# Patient Record
Sex: FEMALE | NOT HISPANIC OR LATINO | URBAN - METROPOLITAN AREA
[De-identification: names, ages, dates, MRNs, and addresses within clinical notes are randomized per-mention and may not be internally consistent; named-entity substitution may affect disease eponyms.]

---

## 2018-12-11 ENCOUNTER — INPATIENT (INPATIENT)
Facility: HOSPITAL | Age: 78
LOS: 10 days | Discharge: HOME HEALTH | End: 2018-12-22
Attending: INTERNAL MEDICINE | Admitting: INTERNAL MEDICINE
Payer: MEDICARE

## 2018-12-11 VITALS
DIASTOLIC BLOOD PRESSURE: 60 MMHG | RESPIRATION RATE: 18 BRPM | SYSTOLIC BLOOD PRESSURE: 129 MMHG | TEMPERATURE: 98 F | OXYGEN SATURATION: 99 % | HEART RATE: 89 BPM

## 2018-12-11 DIAGNOSIS — I25.84 CORONARY ATHEROSCLEROSIS DUE TO CALCIFIED CORONARY LESION: ICD-10-CM

## 2018-12-11 DIAGNOSIS — I25.5 ISCHEMIC CARDIOMYOPATHY: ICD-10-CM

## 2018-12-11 DIAGNOSIS — I11.0 HYPERTENSIVE HEART DISEASE WITH HEART FAILURE: ICD-10-CM

## 2018-12-11 DIAGNOSIS — I25.110 ATHEROSCLEROTIC HEART DISEASE OF NATIVE CORONARY ARTERY WITH UNSTABLE ANGINA PECTORIS: ICD-10-CM

## 2018-12-11 DIAGNOSIS — I10 ESSENTIAL (PRIMARY) HYPERTENSION: ICD-10-CM

## 2018-12-11 DIAGNOSIS — N39.0 URINARY TRACT INFECTION, SITE NOT SPECIFIED: ICD-10-CM

## 2018-12-11 DIAGNOSIS — I25.82 CHRONIC TOTAL OCCLUSION OF CORONARY ARTERY: ICD-10-CM

## 2018-12-11 DIAGNOSIS — I50.21 ACUTE SYSTOLIC (CONGESTIVE) HEART FAILURE: ICD-10-CM

## 2018-12-11 DIAGNOSIS — Z87.891 PERSONAL HISTORY OF NICOTINE DEPENDENCE: ICD-10-CM

## 2018-12-11 DIAGNOSIS — K52.9 NONINFECTIVE GASTROENTERITIS AND COLITIS, UNSPECIFIED: ICD-10-CM

## 2018-12-11 DIAGNOSIS — R07.9 CHEST PAIN, UNSPECIFIED: ICD-10-CM

## 2018-12-11 LAB
ALBUMIN SERPL ELPH-MCNC: 4.2 G/DL — SIGNIFICANT CHANGE UP (ref 3.5–5.2)
ALP SERPL-CCNC: 94 U/L — SIGNIFICANT CHANGE UP (ref 30–115)
ALT FLD-CCNC: 14 U/L — SIGNIFICANT CHANGE UP (ref 0–41)
ANION GAP SERPL CALC-SCNC: 20 MMOL/L — HIGH (ref 7–14)
AST SERPL-CCNC: 19 U/L — SIGNIFICANT CHANGE UP (ref 0–41)
BASE EXCESS BLDV CALC-SCNC: -0.6 MMOL/L — SIGNIFICANT CHANGE UP (ref -2–2)
BILIRUB SERPL-MCNC: 0.4 MG/DL — SIGNIFICANT CHANGE UP (ref 0.2–1.2)
BUN SERPL-MCNC: 15 MG/DL — SIGNIFICANT CHANGE UP (ref 10–20)
CA-I SERPL-SCNC: 1.23 MMOL/L — SIGNIFICANT CHANGE UP (ref 1.12–1.3)
CALCIUM SERPL-MCNC: 9.4 MG/DL — SIGNIFICANT CHANGE UP (ref 8.5–10.1)
CHLORIDE SERPL-SCNC: 104 MMOL/L — SIGNIFICANT CHANGE UP (ref 98–110)
CO2 SERPL-SCNC: 21 MMOL/L — SIGNIFICANT CHANGE UP (ref 17–32)
CREAT SERPL-MCNC: 1.2 MG/DL — SIGNIFICANT CHANGE UP (ref 0.7–1.5)
GAS PNL BLDV: 142 MMOL/L — SIGNIFICANT CHANGE UP (ref 136–145)
GAS PNL BLDV: SIGNIFICANT CHANGE UP
GLUCOSE BLDC GLUCOMTR-MCNC: 123 MG/DL — HIGH (ref 70–99)
GLUCOSE SERPL-MCNC: 104 MG/DL — HIGH (ref 70–99)
HCO3 BLDV-SCNC: 23 MMOL/L — SIGNIFICANT CHANGE UP (ref 22–29)
HCT VFR BLD CALC: 34.7 % — LOW (ref 37–47)
HCT VFR BLDA CALC: 37.4 % — SIGNIFICANT CHANGE UP (ref 34–44)
HGB BLD CALC-MCNC: 12.2 G/DL — LOW (ref 14–18)
HGB BLD-MCNC: 11.5 G/DL — LOW (ref 12–16)
LACTATE BLDV-MCNC: 0.9 MMOL/L — SIGNIFICANT CHANGE UP (ref 0.5–1.6)
MCHC RBC-ENTMCNC: 28.8 PG — SIGNIFICANT CHANGE UP (ref 27–31)
MCHC RBC-ENTMCNC: 33.1 G/DL — SIGNIFICANT CHANGE UP (ref 32–37)
MCV RBC AUTO: 86.8 FL — SIGNIFICANT CHANGE UP (ref 81–99)
NRBC # BLD: 0 /100 WBCS — SIGNIFICANT CHANGE UP (ref 0–0)
PCO2 BLDV: 33 MMHG — LOW (ref 41–51)
PH BLDV: 7.45 — HIGH (ref 7.26–7.43)
PLATELET # BLD AUTO: 261 K/UL — SIGNIFICANT CHANGE UP (ref 130–400)
PO2 BLDV: 50 MMHG — HIGH (ref 20–40)
POTASSIUM BLDV-SCNC: 3.9 MMOL/L — SIGNIFICANT CHANGE UP (ref 3.3–5.6)
POTASSIUM SERPL-MCNC: 4.1 MMOL/L — SIGNIFICANT CHANGE UP (ref 3.5–5)
POTASSIUM SERPL-SCNC: 4.1 MMOL/L — SIGNIFICANT CHANGE UP (ref 3.5–5)
PROT SERPL-MCNC: 7 G/DL — SIGNIFICANT CHANGE UP (ref 6–8)
RBC # BLD: 4 M/UL — LOW (ref 4.2–5.4)
RBC # FLD: 13.4 % — SIGNIFICANT CHANGE UP (ref 11.5–14.5)
SAO2 % BLDV: 89 % — SIGNIFICANT CHANGE UP
SODIUM SERPL-SCNC: 145 MMOL/L — SIGNIFICANT CHANGE UP (ref 135–146)
TROPONIN T SERPL-MCNC: <0.01 NG/ML — SIGNIFICANT CHANGE UP
WBC # BLD: 8.98 K/UL — SIGNIFICANT CHANGE UP (ref 4.8–10.8)
WBC # FLD AUTO: 8.98 K/UL — SIGNIFICANT CHANGE UP (ref 4.8–10.8)

## 2018-12-11 RX ORDER — ASPIRIN/CALCIUM CARB/MAGNESIUM 324 MG
81 TABLET ORAL DAILY
Qty: 0 | Refills: 0 | Status: DISCONTINUED | OUTPATIENT
Start: 2018-12-11 | End: 2018-12-22

## 2018-12-11 RX ORDER — ASPIRIN/CALCIUM CARB/MAGNESIUM 324 MG
162 TABLET ORAL ONCE
Qty: 0 | Refills: 0 | Status: COMPLETED | OUTPATIENT
Start: 2018-12-11 | End: 2018-12-11

## 2018-12-11 RX ORDER — HEPARIN SODIUM 5000 [USP'U]/ML
4100 INJECTION INTRAVENOUS; SUBCUTANEOUS EVERY 6 HOURS
Qty: 0 | Refills: 0 | Status: DISCONTINUED | OUTPATIENT
Start: 2018-12-11 | End: 2018-12-12

## 2018-12-11 RX ORDER — ATORVASTATIN CALCIUM 80 MG/1
80 TABLET, FILM COATED ORAL AT BEDTIME
Qty: 0 | Refills: 0 | Status: DISCONTINUED | OUTPATIENT
Start: 2018-12-11 | End: 2018-12-22

## 2018-12-11 RX ORDER — METOPROLOL TARTRATE 50 MG
25 TABLET ORAL EVERY 12 HOURS
Qty: 0 | Refills: 0 | Status: DISCONTINUED | OUTPATIENT
Start: 2018-12-11 | End: 2018-12-18

## 2018-12-11 RX ORDER — HEPARIN SODIUM 5000 [USP'U]/ML
INJECTION INTRAVENOUS; SUBCUTANEOUS
Qty: 25000 | Refills: 0 | Status: DISCONTINUED | OUTPATIENT
Start: 2018-12-11 | End: 2018-12-12

## 2018-12-11 RX ORDER — SODIUM CHLORIDE 9 MG/ML
1000 INJECTION INTRAMUSCULAR; INTRAVENOUS; SUBCUTANEOUS
Qty: 0 | Refills: 0 | Status: DISCONTINUED | OUTPATIENT
Start: 2018-12-11 | End: 2018-12-12

## 2018-12-11 RX ORDER — CLOPIDOGREL BISULFATE 75 MG/1
300 TABLET, FILM COATED ORAL ONCE
Qty: 0 | Refills: 0 | Status: COMPLETED | OUTPATIENT
Start: 2018-12-11 | End: 2018-12-11

## 2018-12-11 RX ORDER — HEPARIN SODIUM 5000 [USP'U]/ML
4100 INJECTION INTRAVENOUS; SUBCUTANEOUS ONCE
Qty: 0 | Refills: 0 | Status: COMPLETED | OUTPATIENT
Start: 2018-12-11 | End: 2018-12-11

## 2018-12-11 RX ORDER — CLOPIDOGREL BISULFATE 75 MG/1
75 TABLET, FILM COATED ORAL DAILY
Qty: 0 | Refills: 0 | Status: DISCONTINUED | OUTPATIENT
Start: 2018-12-11 | End: 2018-12-13

## 2018-12-11 RX ORDER — PANTOPRAZOLE SODIUM 20 MG/1
40 TABLET, DELAYED RELEASE ORAL
Qty: 0 | Refills: 0 | Status: DISCONTINUED | OUTPATIENT
Start: 2018-12-11 | End: 2018-12-22

## 2018-12-11 RX ADMIN — SODIUM CHLORIDE 75 MILLILITER(S): 9 INJECTION INTRAMUSCULAR; INTRAVENOUS; SUBCUTANEOUS at 23:21

## 2018-12-11 RX ADMIN — HEPARIN SODIUM 4100 UNIT(S): 5000 INJECTION INTRAVENOUS; SUBCUTANEOUS at 19:34

## 2018-12-11 RX ADMIN — CLOPIDOGREL BISULFATE 300 MILLIGRAM(S): 75 TABLET, FILM COATED ORAL at 19:33

## 2018-12-11 RX ADMIN — HEPARIN SODIUM 800 UNIT(S)/HR: 5000 INJECTION INTRAVENOUS; SUBCUTANEOUS at 19:33

## 2018-12-11 RX ADMIN — ATORVASTATIN CALCIUM 80 MILLIGRAM(S): 80 TABLET, FILM COATED ORAL at 23:17

## 2018-12-11 RX ADMIN — Medication 162 MILLIGRAM(S): at 18:57

## 2018-12-11 NOTE — CONSULT NOTE ADULT - SUBJECTIVE AND OBJECTIVE BOX
Patient is a 78y old  Female who presents with a chief complaint of chest pain.   HPI:  78 yr old female with PMH of HTN p/w chest pain. History provided by both pt and her son. Pt c/o exertional dyspnea and substernal chest pressure relieved with rest, for last 2 and half weeks. Prior to that pt never had any symptoms. Patients symptoms were getting worse over the past few days and pt was getting chest pains even with minimal exertion and sometimes even at rest. Pt went to her PMD's office where ECG and blood work (cardiac blood work; details unknown) were abnormal and pt was sent to ED. Pt denies any h/o heart disease/ fever/ cough/ palpitations/ h/o bleeding. Pt is currently asymptomatic at rest.     ROS:  All other systems reviewed and are negative    PAST MEDICAL & SURGICAL HISTORY  Hypertension  No significant past surgical history      FAMILY HISTORY:  FAMILY HISTORY:  No pertinent family history in first degree relatives      SOCIAL HISTORY:  [-]smoker  [-]Alcohol  [-]Drug    ALLERGIES:  No Known Allergies      MEDICATIONS:  MEDICATIONS  (STANDING):  clopidogrel Tablet 300 milliGRAM(s) Oral once  heparin  Infusion.  Unit(s)/Hr (8 mL/Hr) IV Continuous <Continuous>  heparin  Injectable 4100 Unit(s) IV Push once    MEDICATIONS  (PRN):  heparin  Injectable 4100 Unit(s) IV Push every 6 hours PRN For aPTT less than 40      HOME MEDICATIONS:  Home Medications:      VITALS:   T(F): 97.5 (12-11 @ 16:05), Max: 97.5 (12-11 @ 16:05)  HR: 89 (12-11 @ 16:05) (89 - 89)  BP: 129/60 (12-11 @ 16:05) (129/60 - 129/60)  BP(mean): --  RR: 18 (12-11 @ 16:05) (18 - 18)  SpO2: 99% (12-11 @ 16:05) (99% - 99%)    I&O's Summary      PHYSICAL EXAM:  GEN: Alert and oriented X 3, No acute distress  HEENT: no pallor  NECK: Supple, no JVD  LUNGS: Clear to auscultation bilaterally  CARDIOVASCULAR: S1/S2 regular, no murmurs or rubs  ABD: Soft, BS+  EXT: No Lower extremity edema/cyanosis  NEURO: Non focal  SKIN: Intact    LABS:                        11.5   8.98  )-----------( 261      ( 11 Dec 2018 17:13 )             34.7     12-11    145  |  104  |  15  ----------------------------<  104<H>  4.1   |  21  |  1.2    Ca    9.4      11 Dec 2018 17:13    TPro  7.0  /  Alb  4.2  /  TBili  0.4  /  DBili  x   /  AST  19  /  ALT  14  /  AlkPhos  94  12-11      Troponin T, Serum: <0.01 ng/mL (12-11-18 @ 17:13)    CARDIAC MARKERS ( 11 Dec 2018 17:13 )  x     / <0.01 ng/mL / x     / x     / x            Troponin trend:    Serum Pro-Brain Natriuretic Peptide: 8394 pg/mL (12-11-18 @ 17:13)      Hemoglobin A1C   Thyroid      RADIOLOGY:      ECG:  SR, T wave inversion v2-v5

## 2018-12-11 NOTE — ED PROVIDER NOTE - ST/T WAVE
T wave inversions anteroseptal leads, no prior ekg found T wave bipahsic inversions anteroseptal leads, no prior ekg found

## 2018-12-11 NOTE — ED ADULT NURSE REASSESSMENT NOTE - NS ED NURSE REASSESS COMMENT FT1
patient assessed, no complaints of chest pain at this time, vital signs stable, cardiac monitoring maintained as per order, ASA 81mg given, plan of care reviewed with patient and son at bedside, will continue to monitor.

## 2018-12-11 NOTE — ED PROVIDER NOTE - MEDICAL DECISION MAKING DETAILS
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 Plan d/w patient and family.

## 2018-12-11 NOTE — H&P ADULT - NSHPPHYSICALEXAM_GEN_ALL_CORE
PHYSICAL EXAM:  GEN: No acute distress  HEENT: EOMI. No sclera icterus.   LUNGS: Clear to auscultation bilaterally. No wheeze/rales/crackles.   HEART: S1/S2 present. RRR. No murmur/gallops/rubs.   ABD: Soft, non-tender, non-distended. Bowel sounds present  EXT: No pitting edema.   NEURO: AAOX3. Non focal.

## 2018-12-11 NOTE — CONSULT NOTE ADULT - ASSESSMENT
78 yr old female with PMH of HTN p/w chest pain.      Unstable Angina      Plan:  -Wellens' pattern on ECG  -Possible Proximal LAD stenosis   -Pt is currently asymptomatic   - mg, Plavix 300 mg x1   -Heparin infusion ACS protocol, monitor PTT  -Lipitor 80mg, Lopressor 25 mg BID  -check ECHO, lipids, HBA1C  -serial CE  -if pain recurs, check stat ECG and notify cardiology team  -NPO after mid night for possible cardiac cath tomorrow. 78 yr old female with PMH of HTN p/w chest pain.      Unstable Angina      Plan:  -Wellens' pattern on ECG  -Possible Proximal LAD stenosis   -Pt is currently asymptomatic   - mg, Plavix 300 mg x1   -Heparin infusion ACS protocol, monitor PTT  -Lipitor 80mg, Lopressor 25 mg BID  -check ECHO, lipids, HBA1C  -serial CE, repeat ECG in AM  -if pain recurs, check stat ECG and notify cardiology team  -NPO after mid night for possible cardiac cath tomorrow.

## 2018-12-11 NOTE — ED PROVIDER NOTE - PROGRESS NOTE DETAILS
case d/w card fellow - missy arenas seen by crd fellow. still pending trop.  case endorsed to dr berg, f/u labs and onesimo Spoke to [place] cardiac fellow. Discussed labs -- Negative troponin, BNP 8300. Admit to telemetry, NPO after midnight. Dr. Mcclain advised to admit patient to CCU, started on aspirin, plavix, and heparin full ACS protocol

## 2018-12-11 NOTE — ED ADULT NURSE NOTE - CHPI ED NUR SYMPTOMS NEG
no tingling/no weakness/no chills/no decreased eating/drinking/no dizziness/no fever/no nausea/no vomiting

## 2018-12-11 NOTE — H&P ADULT - NSHPLABSRESULTS_GEN_ALL_CORE
11.5   8.98  )-----------( 261      ( 11 Dec 2018 17:13 )             34.7             12-11    145  |  104  |  15  ----------------------------<  104<H>  4.1   |  21  |  1.2    Ca    9.4      11 Dec 2018 17:13    TPro  7.0  /  Alb  4.2  /  TBili  0.4  /  DBili  x   /  AST  19  /  ALT  14  /  AlkPhos  94  12-11    LIVER FUNCTIONS - ( 11 Dec 2018 17:13 )  Alb: 4.2 g/dL / Pro: 7.0 g/dL / ALK PHOS: 94 U/L / ALT: 14 U/L / AST: 19 U/L / GGT: x                   CARDIAC MARKERS ( 11 Dec 2018 17:13 )  x     / <0.01 ng/mL / x     / x     / x                Serum Pro-Brain Natriuretic Peptide: 8394 pg/mL (12-11-18 @ 17:13)

## 2018-12-11 NOTE — H&P ADULT - ASSESSMENT
79 y/o female h/o HTN p/w CP. Started 2 weeks ago, started exertional a/w SOB, diaphoresis. CP and SOB worsened until Saturday. Went to [facility] -- took EKG and labs. When labwork returned, Ernst spoke to pt's son and said to come to ED for concerning labwork. Pt has never had CP like this in past. No fever, chills, recent travel, leg swelling      Typical Chest pain (r/o ACS)  - 1st CE: negative.   - BNP 8394  - EKG: T wave inversion in lateral leads.   - s/p ASA 162mg, Plavix 300mg x 1 in ED.  - start on heparin drip ACS protocol (monitor PTT with target of 55-75)  - c/w ASA, Plavix and statin  - c/w lopressor 25mg BID   - f/u 2nd CE  - f/u Lipid panel and HbA1c  - check Echo  - f/u Cardiology   - Keep NPO after midnight.   - will monitor in CCU.    HTN  - c/w home meds      DVT ppx: on heparin drip  GI ppx: PPI   Activity: Bed rest   Diet: DASH diet and NPO after midnight. 77 y/o female h/o HTN p/w CP. Started 2 weeks ago, started exertional a/w SOB, diaphoresis. CP and SOB worsened until Saturday. Went to [facility] -- took EKG and labs. When labwork returned, Ernst spoke to pt's son and said to come to ED for concerning labwork. Pt has never had CP like this in past. No fever, chills, recent travel, leg swelling      Typical Chest pain 2/2 Unstable angina   - Patient is currently asymptomatic and hemodynamically stable.   - 1st CE: negative.   - BNP 8394  - EKG: Wellens' pattern on ECG -->Possible Proximal LAD stenosis   - s/p ASA 162mg, Plavix 300mg x 1 in ED.  - start on heparin drip ACS protocol (monitor PTT with target of 55-75)  - c/w ASA, Plavix and statin  - c/w lopressor 25mg BID   - f/u 2nd CE  - f/u Lipid panel and HbA1c  - check Echo  - If Chest pain recurs --> get STAT EKG, CE and notify Cardio team.    - Keep NPO after midnight for possible LHC in AM.    - Cardio recs appreciated.   - will monitor in CCU.    HTN  - c/w home meds      DVT ppx: on heparin drip  GI ppx: PPI   Activity: Bed rest   Diet: DASH diet and NPO after midnight. 77 y/o female h/o HTN p/w CP. Started 2 weeks ago, started exertional a/w SOB, diaphoresis. CP and SOB worsened until Saturday. Went to [facility] -- took EKG and labs. When labwork returned, Ernst spoke to pt's son and said to come to ED for concerning labwork. Pt has never had CP like this in past. No fever, chills, recent travel, leg swelling      Typical Chest pain 2/2 Unstable angina   - Patient is currently asymptomatic and hemodynamically stable.   - 1st CE: negative.   - BNP 8394  - EKG: Wellens' pattern on ECG -->Possible Proximal LAD stenosis   - s/p ASA 162mg, Plavix 300mg x 1 in ED.  - start on heparin drip ACS protocol (monitor PTT with target of 55-75)  - c/w ASA, Plavix and statin  - c/w lopressor 25mg BID   - f/u 2nd CE  - f/u Lipid panel and HbA1c  - check Echo  - If Chest pain recurs --> get STAT EKG, CE and notify Cardio team.    - Keep NPO after midnight for possible LHC in AM.    - Cardio recs appreciated.   - will monitor in CCU.    FELIZ or CKD?  - will start on IVF NS @ 75cc/hr  - monitor BMP  - f/u Urine Electrolytes, Urine Cr, Urine Pro/Cr     HTN  - c/w home meds      DVT ppx: on heparin drip  GI ppx: PPI   Activity: Bed rest   Diet: DASH diet and NPO after midnight. 79 y/o female h/o HTN p/w CP. Started 2 weeks ago, started exertional a/w SOB, diaphoresis. CP and SOB worsened until Saturday. Went to [facility] -- took EKG and labs. When labwork returned, Ernst spoke to pt's son and said to come to ED for concerning labwork. Pt has never had CP like this in past. No fever, chills, recent travel, leg swelling      Typical Chest pain 2/2 Unstable angina   - Patient is currently asymptomatic and hemodynamically stable.   - 1st CE: negative.   - BNP 8394  - EKG: Wellens' pattern on ECG -->Possible Proximal LAD stenosis   - s/p ASA 162mg, Plavix 300mg x 1 in ED.  - start on heparin drip ACS protocol (monitor PTT with target of 55-75)  - c/w ASA, Plavix and statin  - c/w lopressor 25mg BID   - f/u 2nd CE  - f/u Lipid panel and HbA1c  - check Echo  - If Chest pain recurs --> get STAT EKG, CE and notify Cardio team.    - Keep NPO after midnight for possible LHC in AM.    - Cardio recs appreciated.   - will monitor in CCU.    FELIZ or CKD?  - will start on IVF NS @ 75cc/hr  - monitor BMP  - f/u U/A, Urine Electrolytes, Urine Cr, Urine Pro/Cr     HTN  - c/w home meds      DVT ppx: on heparin drip  GI ppx: PPI   Activity: Bed rest   Diet: DASH diet and NPO after midnight. 77 yo F with PMH of HTN present with substernal chest pressure relieved with rest, for the past 2 and half weeks a/w exertional dyspnea, found to have T wave bipahsic inversions anteroseptal leads. Patient was evaluated by cardio team in ED and recommends to start on heparin drip and CCU monitoring.       Typical Chest pain 2/2 Unstable angina   - Patient is currently asymptomatic and hemodynamically stable.   - 1st CE: negative.   - BNP 8394  - EKG: T wave bipahsic inversions anteroseptal leads. Wellens' pattern on ECG -->Possible Proximal LAD stenosis.  - s/p ASA 162mg, Plavix 300mg x 1 in ED.  - start on heparin drip ACS protocol (monitor PTT with target of 55-75)  - c/w ASA, Plavix and statin  - c/w lopressor 25mg BID   - f/u 2nd CE  - f/u Lipid panel and HbA1c  - check Echo  - If Chest pain recurs --> get STAT EKG, CE and notify Cardio team.    - Keep NPO after midnight for possible LHC in AM.    - Cardio recs appreciated.   - will monitor in CCU.    FELIZ or CKD?  - will start on IVF NS @ 75cc/hr  - monitor BMP  - f/u U/A, Urine Electrolytes, Urine Cr, Urine Pro/Cr     HTN  - c/w home meds      DVT ppx: on heparin drip  GI ppx: PPI   Activity: Bed rest   Diet: DASH diet and NPO after midnight.

## 2018-12-11 NOTE — H&P ADULT - ATTENDING COMMENTS
78 yr old female with PMH of HTN p/w chest pain. History provided by both pt and her son. Pt c/o exertional dyspnea and substernal chest pressure relieved with rest, for last 2 and half weeks. Prior to that pt never had any symptoms. Patients symptoms were getting worse over the past few days and pt was getting chest pains even with minimal exertion and sometimes even at rest. Pt went to her PMD's office where ECG and blood work (cardiac blood work; details unknown) were abnormal and pt was sent to ED. Pt denies any h/o heart disease/ fever/ cough/ palpitations/ h/o bleeding. Pt is currently asymptomatic at rest.       79 y/o female h/o HTN p/w CP. Started 2 weeks ago, started exertional a/w SOB, diaphoresis. CP and SOB worsened until Saturday. Went to [facility] -- took EKG and labs. When labwork returned, Cherise spoke to pt's son and said to come to ED for concerning labwork. Pt has never had CP like this in past. No fever, chills, recent travel, leg swelling    REVIEW OF SYSTEMS: see cc/HPI   CONSTITUTIONAL: No weakness, fevers or chills  EYES/ENT: No visual changes;  No vertigo or throat pain   NECK: No pain or stiffness  RESPIRATORY: No cough, wheezing, hemoptysis; No shortness of breath  CARDIOVASCULAR: (+) chest pain or palpitations  GASTROINTESTINAL: No abdominal or epigastric pain. No nausea, vomiting, or hematemesis; No diarrhea or constipation. No melena or hematochezia.  GENITOURINARY: No dysuria, frequency or hematuria  NEUROLOGICAL: No numbness or weakness  SKIN: No itching, rashes    Physical Exam:  General: WN/WD NAD  Neurology: A&Ox3, nonfocal, follows commands  Eyes: PERRLA/ EOMI  ENT/Neck: Neck supple, trachea midline, No JVD  Respiratory: CTA B/L, No wheezing, rales, rhonchi  CV: Normal rate regular rhythm, S1S2, no murmurs, rubs or gallops  Abdominal: Soft, NT, ND +BS,   Extremities: No edema, + peripheral pulses  Skin: No Rashes, Hematoma, Ecchymosis  Incisions:   Tubes:    A/P 78 yr old female with PMH of HTN p/w chest pain. History provided by both pt and her son. Pt c/o exertional dyspnea and substernal chest pressure relieved with rest, for last 2 and half weeks. Prior to that pt never had any symptoms. Patients symptoms were getting worse over the past few days and pt was getting chest pains even with minimal exertion and sometimes even at rest. Pt went to her PMD's office where ECG and blood work (cardiac blood work; details unknown) were abnormal and pt was sent to ED. Pt denies any h/o heart disease/ fever/ cough/ palpitations/ h/o bleeding. Pt is currently asymptomatic at rest.       79 y/o female h/o HTN p/w CP. Started 2 weeks ago, started exertional a/w SOB, diaphoresis. CP and SOB worsened until Saturday. Went to PMD [facility] -- took EKG and labs. When labwork returned, Cherise spoke to pt's son and said to come to ED for concerning labwork. Pt has never had CP like this in past. No fever, chills, recent travel, leg swelling    REVIEW OF SYSTEMS: see cc/HPI   CONSTITUTIONAL: No weakness, fevers or chills  EYES/ENT: No visual changes;  No vertigo or throat pain   NECK: No pain or stiffness  RESPIRATORY: No cough, wheezing, hemoptysis; No shortness of breath  CARDIOVASCULAR: (+) chest pain or palpitations  GASTROINTESTINAL: No abdominal or epigastric pain. No nausea, vomiting, or hematemesis; No diarrhea or constipation. No melena or hematochezia.  GENITOURINARY: No dysuria, frequency or hematuria  NEUROLOGICAL: No numbness or weakness  SKIN: No itching, rashes    < from: 12 Lead ECG (12.11.18 @ 16:12) >  Ventricular Rate 68 BPM  Atrial Rate 68 BPM  P-R Interval 134 ms  QRS Duration 88 ms  Q-T Interval 440 ms  QTC Calculation(Bezet) 467 ms  P Axis 50 degrees  R Axis 35 degrees  T Axis 108 degrees  Diagnosis Line Normal sinus rhythm  Anteroseptal infarct , age undetermined  T wave abnormality, consider lateral ischemia  Abnormal ECG  Confirmed by Phong Aquino ([id number]) on 12/12/2018 6:13:07 AM  < end of copied text >        Physical Exam:  General: WN/WD NAD  Neurology: A&Ox3, nonfocal, follows commands  Eyes: PERRLA/ EOMI  ENT/Neck: Neck supple, trachea midline, No JVD  Respiratory: CTA B/L, No wheezing, rales, rhonchi  CV: Normal rate regular rhythm, S1S2, no murmurs, rubs or gallops  Abdominal: Soft, NT, ND +BS,   Extremities: No edema, + peripheral pulses  Skin: No Rashes, Hematoma, Ecchymosis  Incisions:   Tubes:    A/P  Unstable angina  -IV heparin drip and PTT monitoring   -NPO for possible cath  -ASA/Plavix/ BBlocker/ statin   -serial CE and EKG  -Cardiology eval     FELIZ  -IV hydration and repeat Scr  -Is and Os , urine lytes/sc    HTN   -as above

## 2018-12-11 NOTE — ED PROVIDER NOTE - OBJECTIVE STATEMENT
79 y/o female h/o HTN p/w CP. Started 2 weeks ago, started exertional a/w SOB, diaphoresis. CP and SOB worsened until Saturday. Went to [facility] -- took EKG and labs. When labwork returned, Ernst spoke to pt's son and said to come to ED for concerning labwork. Pt has never had CP like this in past. No fever, chills, recent travel, leg swelling.

## 2018-12-11 NOTE — H&P ADULT - HISTORY OF PRESENT ILLNESS
79 y/o female h/o HTN p/w CP. Started 2 weeks ago, started exertional a/w SOB, diaphoresis. CP and SOB worsened until Saturday. Went to [facility] -- took EKG and labs. When labwork returned, Ernst spoke to pt's son and said to come to ED for concerning labwork. Pt has never had CP like this in past. No fever, chills, recent travel, leg swelling 78 yr old female with PMH of HTN p/w chest pain. History provided by both pt and her son. Pt c/o exertional dyspnea and substernal chest pressure relieved with rest, for last 2 and half weeks. Prior to that pt never had any symptoms. Patients symptoms were getting worse over the past few days and pt was getting chest pains even with minimal exertion and sometimes even at rest. Pt went to her PMD's office where ECG and blood work (cardiac blood work; details unknown) were abnormal and pt was sent to ED. Pt denies any h/o heart disease/ fever/ cough/ palpitations/ h/o bleeding. Pt is currently asymptomatic at rest.       77 y/o female h/o HTN p/w CP. Started 2 weeks ago, started exertional a/w SOB, diaphoresis. CP and SOB worsened until Saturday. Went to [facility] -- took EKG and labs. When labwork returned, Ernst spoke to pt's son and said to come to ED for concerning labwork. Pt has never had CP like this in past. No fever, chills, recent travel, leg swelling 77 yo F with PMH of HTN present with chest pain. Patient complaints of substernal chest pressure relieved with rest, for the past 2 and half weeks a/w extertional dyspnea. Patient never had similar symptoms in the past. As per patient, her were getting worse over the past few days to the point that she started getting chest pains even with minimal exertion and sometimes even at rest. So, patient went to her PMD where ECG and blood work were done (cardiac blood work; details unknown)  and was told it was abnormal. Patient was sent by her to the ED. Patient denied any other symptoms.   In ED, patient was found to have T wave bipahsic inversions anteroseptal leads. Currently, Patient is asymptomatic at rest.

## 2018-12-11 NOTE — ED ADULT NURSE NOTE - OBJECTIVE STATEMENT
Patient is a 79 y/o female presenting complaining of chest pain, shortness of breath and diaphoresis. Patient denies any fever, chills, nausea, vomiting.

## 2018-12-11 NOTE — H&P ADULT - NSHPREVIEWOFSYSTEMS_GEN_ALL_CORE
REVIEW OF SYSTEMS:    CONSTITUTIONAL: No weakness, fevers or chills  EYES/ENT: No visual changes;  No vertigo or throat pain   NECK: No pain or stiffness  RESPIRATORY: No cough, wheezing, hemoptysis; No shortness of breath  CARDIOVASCULAR: No chest pain or palpitations  GASTROINTESTINAL: No abdominal or epigastric pain. No nausea, vomiting, or hematemesis; No diarrhea or constipation. No melena or hematochezia.  GENITOURINARY: No dysuria, frequency or hematuria  NEUROLOGICAL: No numbness or weakness  SKIN: No itching, rashes REVIEW OF SYSTEMS:    CONSTITUTIONAL: No weakness, fevers or chills  EYES/ENT: No visual changes;  No vertigo or throat pain   NECK: No pain or stiffness.  RESPIRATORY: No cough, wheezing, hemoptysis; No shortness of breath  CARDIOVASCULAR: No current chest pain or palpitations.   GASTROINTESTINAL: No abdominal or epigastric pain. No nausea, vomiting, or hematemesis; No diarrhea or constipation. No melena or hematochezia.  GENITOURINARY: No dysuria, frequency or hematuria  NEUROLOGICAL: No numbness or weakness  SKIN: No itching, rashes

## 2018-12-11 NOTE — ED PROVIDER NOTE - PHYSICAL EXAMINATION
Constitutional: Well developed, well nourished. NAD. Good general hygiene  Head: Atraumatic.  Eyes: PERRLA. EOMI without discomfort.   ENT: No nasal discharge. Mucous membranes moist.  Neck: Supple. Painless ROM.  Cardiovascular: Regular rhythm. Regular rate. Normal S1 and S2. No murmurs. 2+ pulses in all extremities.   Pulmonary: Normal respiratory rate and effort. Lungs clear to auscultation bilaterally. No wheezing, rales, or rhonchi. Bilateral, equal lung expansion.   Abdominal: Soft. Nondistended. Nontender. No rebound or guarding.   Extremities. Pelvis stable. No lower extremity edema. Symmetric calves.  Skin: No rashes.   Neuro: AAOx3. No focal neurological deficits.  Psych: Normal mood. Normal affect.

## 2018-12-11 NOTE — ED PROVIDER NOTE - NS ED ROS FT
Constitutional: No fever, chills.  Eyes:  No visual changes, eye pain or discharge.  ENMT:  No hearing changes, pain, no sore throat or runny nose, no difficulty swallowing  Cardiac:  CP, SOB. CP w/ exertion.  Respiratory:  No cough or respiratory distress. No hemoptysis. No history of asthma or RAD.  GI:  No nausea, vomiting, diarrhea or abdominal pain.  :  No dysuria, frequency or burning.  MS:  No myalgia, muscle weakness, joint pain or back pain.  Neuro:  No headache or weakness.  No LOC.  Skin:  No skin rash.   Endocrine: No history of thyroid disease or diabetes.

## 2018-12-11 NOTE — ED PROVIDER NOTE - ATTENDING CONTRIBUTION TO CARE
77 yo F htn, now with intermittent exertional CP, nonradiating. + weakness.  vss, nontoxic, well appearing, pink conj, anicteric, MMM, neck supple, CTAB, RRR, equal radial pulses bilat, abd soft/nt/nd, no cva tend. no calves tend, no edema, no fnd. no rashes.  EKG with Wellens, a/p: labs, imaging, reassess

## 2018-12-12 LAB
ANION GAP SERPL CALC-SCNC: 14 MMOL/L — SIGNIFICANT CHANGE UP (ref 7–14)
APPEARANCE UR: ABNORMAL
APTT BLD: 48.8 SEC — HIGH (ref 27–39.2)
APTT BLD: 65.3 SEC — HIGH (ref 27–39.2)
BACTERIA # UR AUTO: ABNORMAL /HPF
BASOPHILS # BLD AUTO: 0.04 K/UL — SIGNIFICANT CHANGE UP (ref 0–0.2)
BASOPHILS NFR BLD AUTO: 0.6 % — SIGNIFICANT CHANGE UP (ref 0–1)
BILIRUB UR-MCNC: NEGATIVE — SIGNIFICANT CHANGE UP
BUN SERPL-MCNC: 14 MG/DL — SIGNIFICANT CHANGE UP (ref 10–20)
CALCIUM SERPL-MCNC: 9.1 MG/DL — SIGNIFICANT CHANGE UP (ref 8.5–10.1)
CHLORIDE SERPL-SCNC: 108 MMOL/L — SIGNIFICANT CHANGE UP (ref 98–110)
CHOLEST SERPL-MCNC: 156 MG/DL — SIGNIFICANT CHANGE UP (ref 100–200)
CK MB CFR SERPL CALC: 2.1 NG/ML — SIGNIFICANT CHANGE UP (ref 0.6–6.3)
CK MB CFR SERPL CALC: 2.1 NG/ML — SIGNIFICANT CHANGE UP (ref 0.6–6.3)
CK SERPL-CCNC: 109 U/L — SIGNIFICANT CHANGE UP (ref 0–225)
CK SERPL-CCNC: 110 U/L — SIGNIFICANT CHANGE UP (ref 0–225)
CO2 SERPL-SCNC: 24 MMOL/L — SIGNIFICANT CHANGE UP (ref 17–32)
COLOR SPEC: YELLOW — SIGNIFICANT CHANGE UP
CREAT ?TM UR-MCNC: 38 MG/DL — SIGNIFICANT CHANGE UP
CREAT SERPL-MCNC: 1 MG/DL — SIGNIFICANT CHANGE UP (ref 0.7–1.5)
DIFF PNL FLD: ABNORMAL
EOSINOPHIL # BLD AUTO: 0.15 K/UL — SIGNIFICANT CHANGE UP (ref 0–0.7)
EOSINOPHIL NFR BLD AUTO: 2.2 % — SIGNIFICANT CHANGE UP (ref 0–8)
ESTIMATED AVERAGE GLUCOSE: 105 MG/DL — SIGNIFICANT CHANGE UP (ref 68–114)
GLUCOSE SERPL-MCNC: 98 MG/DL — SIGNIFICANT CHANGE UP (ref 70–99)
GLUCOSE UR QL: NEGATIVE MG/DL — SIGNIFICANT CHANGE UP
HBA1C BLD-MCNC: 5.3 % — SIGNIFICANT CHANGE UP (ref 4–5.6)
HCT VFR BLD CALC: 34.3 % — LOW (ref 37–47)
HDLC SERPL-MCNC: 56 MG/DL — SIGNIFICANT CHANGE UP
HGB BLD-MCNC: 11.2 G/DL — LOW (ref 12–16)
IMM GRANULOCYTES NFR BLD AUTO: 0.3 % — SIGNIFICANT CHANGE UP (ref 0.1–0.3)
KETONES UR-MCNC: NEGATIVE — SIGNIFICANT CHANGE UP
LEUKOCYTE ESTERASE UR-ACNC: ABNORMAL
LIPID PNL WITH DIRECT LDL SERPL: 90 MG/DL — SIGNIFICANT CHANGE UP (ref 4–129)
LYMPHOCYTES # BLD AUTO: 2.07 K/UL — SIGNIFICANT CHANGE UP (ref 1.2–3.4)
LYMPHOCYTES # BLD AUTO: 30.2 % — SIGNIFICANT CHANGE UP (ref 20.5–51.1)
MAGNESIUM SERPL-MCNC: 2.1 MG/DL — SIGNIFICANT CHANGE UP (ref 1.8–2.4)
MCHC RBC-ENTMCNC: 28.4 PG — SIGNIFICANT CHANGE UP (ref 27–31)
MCHC RBC-ENTMCNC: 32.7 G/DL — SIGNIFICANT CHANGE UP (ref 32–37)
MCV RBC AUTO: 87.1 FL — SIGNIFICANT CHANGE UP (ref 81–99)
MONOCYTES # BLD AUTO: 0.53 K/UL — SIGNIFICANT CHANGE UP (ref 0.1–0.6)
MONOCYTES NFR BLD AUTO: 7.7 % — SIGNIFICANT CHANGE UP (ref 1.7–9.3)
NEUTROPHILS # BLD AUTO: 4.04 K/UL — SIGNIFICANT CHANGE UP (ref 1.4–6.5)
NEUTROPHILS NFR BLD AUTO: 59 % — SIGNIFICANT CHANGE UP (ref 42.2–75.2)
NITRITE UR-MCNC: POSITIVE
NRBC # BLD: 0 /100 WBCS — SIGNIFICANT CHANGE UP (ref 0–0)
PH UR: 7 — SIGNIFICANT CHANGE UP (ref 5–8)
PLATELET # BLD AUTO: 231 K/UL — SIGNIFICANT CHANGE UP (ref 130–400)
POTASSIUM SERPL-MCNC: 4.5 MMOL/L — SIGNIFICANT CHANGE UP (ref 3.5–5)
POTASSIUM SERPL-SCNC: 4.5 MMOL/L — SIGNIFICANT CHANGE UP (ref 3.5–5)
PROT ?TM UR-MCNC: 19 MG/DLG/24H — SIGNIFICANT CHANGE UP
PROT UR-MCNC: NEGATIVE MG/DL — SIGNIFICANT CHANGE UP
PROT/CREAT UR-RTO: 0.5 RATIO — HIGH (ref 0–0.2)
RBC # BLD: 3.94 M/UL — LOW (ref 4.2–5.4)
RBC # FLD: 13.4 % — SIGNIFICANT CHANGE UP (ref 11.5–14.5)
RBC CASTS # UR COMP ASSIST: ABNORMAL /HPF
SODIUM SERPL-SCNC: 146 MMOL/L — SIGNIFICANT CHANGE UP (ref 135–146)
SODIUM UR-SCNC: 116 MMOL/L — SIGNIFICANT CHANGE UP
SP GR SPEC: 1.01 — SIGNIFICANT CHANGE UP (ref 1.01–1.03)
TOTAL CHOLESTEROL/HDL RATIO MEASUREMENT: 2.8 RATIO — LOW (ref 4–5.5)
TRIGL SERPL-MCNC: 81 MG/DL — SIGNIFICANT CHANGE UP (ref 10–149)
TROPONIN T SERPL-MCNC: <0.01 NG/ML — SIGNIFICANT CHANGE UP
TROPONIN T SERPL-MCNC: <0.01 NG/ML — SIGNIFICANT CHANGE UP
UROBILINOGEN FLD QL: 0.2 MG/DL — SIGNIFICANT CHANGE UP (ref 0.2–0.2)
UUN UR-MCNC: 259 MG/DL — SIGNIFICANT CHANGE UP
WBC # BLD: 6.85 K/UL — SIGNIFICANT CHANGE UP (ref 4.8–10.8)
WBC # FLD AUTO: 6.85 K/UL — SIGNIFICANT CHANGE UP (ref 4.8–10.8)
WBC UR QL: ABNORMAL /HPF

## 2018-12-12 PROCEDURE — 93010 ELECTROCARDIOGRAM REPORT: CPT

## 2018-12-12 RX ORDER — HEPARIN SODIUM 5000 [USP'U]/ML
800 INJECTION INTRAVENOUS; SUBCUTANEOUS
Qty: 25000 | Refills: 0 | Status: DISCONTINUED | OUTPATIENT
Start: 2018-12-12 | End: 2018-12-13

## 2018-12-12 RX ORDER — HEPARIN SODIUM 5000 [USP'U]/ML
4100 INJECTION INTRAVENOUS; SUBCUTANEOUS EVERY 6 HOURS
Qty: 0 | Refills: 0 | Status: DISCONTINUED | OUTPATIENT
Start: 2018-12-12 | End: 2018-12-12

## 2018-12-12 RX ORDER — HEPARIN SODIUM 5000 [USP'U]/ML
800 INJECTION INTRAVENOUS; SUBCUTANEOUS
Qty: 25000 | Refills: 0 | Status: DISCONTINUED | OUTPATIENT
Start: 2018-12-12 | End: 2018-12-12

## 2018-12-12 RX ORDER — HEPARIN SODIUM 5000 [USP'U]/ML
INJECTION INTRAVENOUS; SUBCUTANEOUS
Qty: 25000 | Refills: 0 | Status: DISCONTINUED | OUTPATIENT
Start: 2018-12-12 | End: 2018-12-12

## 2018-12-12 RX ORDER — HEPARIN SODIUM 5000 [USP'U]/ML
4100 INJECTION INTRAVENOUS; SUBCUTANEOUS ONCE
Qty: 0 | Refills: 0 | Status: DISCONTINUED | OUTPATIENT
Start: 2018-12-12 | End: 2018-12-12

## 2018-12-12 RX ORDER — CEFTRIAXONE 500 MG/1
1 INJECTION, POWDER, FOR SOLUTION INTRAMUSCULAR; INTRAVENOUS EVERY 24 HOURS
Qty: 0 | Refills: 0 | Status: COMPLETED | OUTPATIENT
Start: 2018-12-12 | End: 2018-12-15

## 2018-12-12 RX ORDER — ONDANSETRON 8 MG/1
4 TABLET, FILM COATED ORAL ONCE
Qty: 0 | Refills: 0 | Status: DISCONTINUED | OUTPATIENT
Start: 2018-12-12 | End: 2018-12-17

## 2018-12-12 RX ORDER — CHLORHEXIDINE GLUCONATE 213 G/1000ML
1 SOLUTION TOPICAL
Qty: 0 | Refills: 0 | Status: DISCONTINUED | OUTPATIENT
Start: 2018-12-12 | End: 2018-12-22

## 2018-12-12 RX ORDER — HEPARIN SODIUM 5000 [USP'U]/ML
500 INJECTION INTRAVENOUS; SUBCUTANEOUS
Qty: 25000 | Refills: 0 | Status: DISCONTINUED | OUTPATIENT
Start: 2018-12-12 | End: 2018-12-12

## 2018-12-12 RX ADMIN — HEPARIN SODIUM 8 UNIT(S)/HR: 5000 INJECTION INTRAVENOUS; SUBCUTANEOUS at 09:41

## 2018-12-12 RX ADMIN — HEPARIN SODIUM 8 UNIT(S)/HR: 5000 INJECTION INTRAVENOUS; SUBCUTANEOUS at 21:44

## 2018-12-12 RX ADMIN — PANTOPRAZOLE SODIUM 40 MILLIGRAM(S): 20 TABLET, DELAYED RELEASE ORAL at 05:11

## 2018-12-12 RX ADMIN — CEFTRIAXONE 100 GRAM(S): 500 INJECTION, POWDER, FOR SOLUTION INTRAMUSCULAR; INTRAVENOUS at 15:15

## 2018-12-12 RX ADMIN — CLOPIDOGREL BISULFATE 75 MILLIGRAM(S): 75 TABLET, FILM COATED ORAL at 12:19

## 2018-12-12 RX ADMIN — Medication 25 MILLIGRAM(S): at 19:22

## 2018-12-12 RX ADMIN — HEPARIN SODIUM 800 UNIT(S)/HR: 5000 INJECTION INTRAVENOUS; SUBCUTANEOUS at 02:08

## 2018-12-12 RX ADMIN — ATORVASTATIN CALCIUM 80 MILLIGRAM(S): 80 TABLET, FILM COATED ORAL at 21:20

## 2018-12-12 RX ADMIN — Medication 25 MILLIGRAM(S): at 05:10

## 2018-12-12 RX ADMIN — Medication 81 MILLIGRAM(S): at 12:19

## 2018-12-12 NOTE — PROGRESS NOTE ADULT - SUBJECTIVE AND OBJECTIVE BOX
PREOPERATIVE DAY OF PROCEDURE EVALUATION:  I have personally seen and examined the patient.  I agree with the history and physical which I have reviewed and noted any changes below.  (Signed electronically by __________)  12-12-18 @ 18:34

## 2018-12-12 NOTE — PROGRESS NOTE ADULT - SUBJECTIVE AND OBJECTIVE BOX
POST OPERATIVE PROCEDURAL DOCUMENTATION  PRE-OP DIAGNOSIS: CAD    POST-OP DIAGNOSIS: CAD    PROCEDURE:   LEFT HEART CATHERIZATION    Physician: Albino DUNNE  Assistant:  Ludwig DUNNE    ANESTHESIA TYPE:  [x ] Sedation  [x ] Local/Regional  [  ]General Anesthesia    ESTIMATED BLOOD LOSS: < 10 ml         CONDITION  [x ] Good  [   ] Fair  [   ] Serious  [   ] Critical      SPECIMENS REMOVED (IF APPLICABLE):  n/a      IMPLANTS (IF APPLICABLE): none      FINDINGS:    LEFT HEART CATHERIZATION                        LVEDP: WNL  LVEF: 30-35% by echo    Left mainmild distal disease    LAD:  Proxiaml: 100% stenosis without late distal filling.                    Diag: supplied by collaterals from LCx    Left Circumflex:  Proximal : moderate disease  OM1:  two sequential 70% lesions    Right Cornary Artery: Proximal RCA: 100% stenosis, distal RCA supplied by collaterals from LCx    PERCUTANEOUS CORONARY INTERVENTIONS: none      COMPLICATIONS:  none      POST-OP DIAGNOSIS: CAD          PLAN OF CARE      [ ] D/C Home today   [ ]  D/C in AM  [x ] Return to In-patient bed  [ ] Admit for observation  [ ] Return for staged procedure:  [x ] CT Surgery consult called  [x ]  Continue DAPT, B-blocker & Statin therapy  [x ]  Medical Therapy  [x ] Aggressive risk factor modification. The patient should follow a low fat and low calorie diet.  Patient may need viability test in next 24 hours, will discuss with CTS team and patient may need CABG vs High risk PCI. POST OPERATIVE PROCEDURAL DOCUMENTATION  PRE-OP DIAGNOSIS: CAD    POST-OP DIAGNOSIS: CAD    PROCEDURE:   LEFT HEART CATHERIZATION    Physician: Albino DUNNE  Assistant:  Ludwig DUNNE    ANESTHESIA TYPE:  [x ] Sedation  [x ] Local/Regional  [  ]General Anesthesia    ESTIMATED BLOOD LOSS: < 10 ml         CONDITION  [x ] Good  [   ] Fair  [   ] Serious  [   ] Critical      SPECIMENS REMOVED (IF APPLICABLE):  n/a      IMPLANTS (IF APPLICABLE): none      FINDINGS:    LEFT HEART CATHERIZATION                        LVEDP: WNL  LVEF: 30-35% by echo    Left mainmild distal disease    LAD:  Proxiaml: 100% stenosis without late distal filling.                    Diag: supplied by collaterals from LCx    Left Circumflex:  Proximal : moderate disease  OM1:  two sequential 70% lesions    Right Cornary Artery: Proximal RCA: 100% stenosis, distal RCA supplied by collaterals from LCx    PERCUTANEOUS CORONARY INTERVENTIONS: none      COMPLICATIONS:  none      POST-OP DIAGNOSIS: CAD          PLAN OF CARE      [ ] D/C Home today   [ ]  D/C in AM  [x ] Return to In-patient bed  [ ] Admit for observation  [ ] Return for staged procedure:  [x ] CT Surgery consult called  [x ]  Continue DAPT, B-blocker & Statin therapy  [x ]  Medical Therapy  [x ] Aggressive risk factor modification. The patient should follow a low fat and low calorie diet.  Patient may need viability test in next 24 hours, will discuss with CTS team and patient may need CABG vs High risk PCI.      Severe 3vcad with total rca and LAD without any good collaterals to LAD nor any substantial antegrade flow.   The LCX is the only patent vessels with 70% tandem lesion.     If no viability and is not a candidate for surgery will do high risk PCI nayan next week on LCX.     In the interim will get EPS consult as well.

## 2018-12-13 LAB
ALBUMIN SERPL ELPH-MCNC: 3.8 G/DL — SIGNIFICANT CHANGE UP (ref 3.5–5.2)
ALP SERPL-CCNC: 85 U/L — SIGNIFICANT CHANGE UP (ref 30–115)
ALT FLD-CCNC: 11 U/L — SIGNIFICANT CHANGE UP (ref 0–41)
ANION GAP SERPL CALC-SCNC: 12 MMOL/L — SIGNIFICANT CHANGE UP (ref 7–14)
APTT BLD: 34.5 SEC — SIGNIFICANT CHANGE UP (ref 27–39.2)
AST SERPL-CCNC: 15 U/L — SIGNIFICANT CHANGE UP (ref 0–41)
BILIRUB SERPL-MCNC: 0.7 MG/DL — SIGNIFICANT CHANGE UP (ref 0.2–1.2)
BLD GP AB SCN SERPL QL: SIGNIFICANT CHANGE UP
BUN SERPL-MCNC: 15 MG/DL — SIGNIFICANT CHANGE UP (ref 10–20)
CALCIUM SERPL-MCNC: 9.1 MG/DL — SIGNIFICANT CHANGE UP (ref 8.5–10.1)
CHLORIDE SERPL-SCNC: 105 MMOL/L — SIGNIFICANT CHANGE UP (ref 98–110)
CK MB CFR SERPL CALC: 2.2 NG/ML — SIGNIFICANT CHANGE UP (ref 0.6–6.3)
CK SERPL-CCNC: 113 U/L — SIGNIFICANT CHANGE UP (ref 0–225)
CO2 SERPL-SCNC: 24 MMOL/L — SIGNIFICANT CHANGE UP (ref 17–32)
CREAT SERPL-MCNC: 1.1 MG/DL — SIGNIFICANT CHANGE UP (ref 0.7–1.5)
GLUCOSE SERPL-MCNC: 97 MG/DL — SIGNIFICANT CHANGE UP (ref 70–99)
HCT VFR BLD CALC: 35.4 % — LOW (ref 37–47)
HGB BLD-MCNC: 11.5 G/DL — LOW (ref 12–16)
MAGNESIUM SERPL-MCNC: 2.1 MG/DL — SIGNIFICANT CHANGE UP (ref 1.8–2.4)
MCHC RBC-ENTMCNC: 28.6 PG — SIGNIFICANT CHANGE UP (ref 27–31)
MCHC RBC-ENTMCNC: 32.5 G/DL — SIGNIFICANT CHANGE UP (ref 32–37)
MCV RBC AUTO: 88.1 FL — SIGNIFICANT CHANGE UP (ref 81–99)
NRBC # BLD: 0 /100 WBCS — SIGNIFICANT CHANGE UP (ref 0–0)
PLATELET # BLD AUTO: 229 K/UL — SIGNIFICANT CHANGE UP (ref 130–400)
POTASSIUM SERPL-MCNC: 4.4 MMOL/L — SIGNIFICANT CHANGE UP (ref 3.5–5)
POTASSIUM SERPL-SCNC: 4.4 MMOL/L — SIGNIFICANT CHANGE UP (ref 3.5–5)
PROT SERPL-MCNC: 6.4 G/DL — SIGNIFICANT CHANGE UP (ref 6–8)
RBC # BLD: 4.02 M/UL — LOW (ref 4.2–5.4)
RBC # FLD: 13.3 % — SIGNIFICANT CHANGE UP (ref 11.5–14.5)
SODIUM SERPL-SCNC: 141 MMOL/L — SIGNIFICANT CHANGE UP (ref 135–146)
TROPONIN T SERPL-MCNC: <0.01 NG/ML — SIGNIFICANT CHANGE UP
TYPE + AB SCN PNL BLD: SIGNIFICANT CHANGE UP
WBC # BLD: 6.47 K/UL — SIGNIFICANT CHANGE UP (ref 4.8–10.8)
WBC # FLD AUTO: 6.47 K/UL — SIGNIFICANT CHANGE UP (ref 4.8–10.8)

## 2018-12-13 PROCEDURE — 99222 1ST HOSP IP/OBS MODERATE 55: CPT

## 2018-12-13 RX ORDER — HEPARIN SODIUM 5000 [USP'U]/ML
1000 INJECTION INTRAVENOUS; SUBCUTANEOUS
Qty: 25000 | Refills: 0 | Status: DISCONTINUED | OUTPATIENT
Start: 2018-12-13 | End: 2018-12-13

## 2018-12-13 RX ADMIN — ATORVASTATIN CALCIUM 80 MILLIGRAM(S): 80 TABLET, FILM COATED ORAL at 21:23

## 2018-12-13 RX ADMIN — Medication 25 MILLIGRAM(S): at 06:16

## 2018-12-13 RX ADMIN — Medication 81 MILLIGRAM(S): at 11:41

## 2018-12-13 RX ADMIN — PANTOPRAZOLE SODIUM 40 MILLIGRAM(S): 20 TABLET, DELAYED RELEASE ORAL at 06:16

## 2018-12-13 RX ADMIN — CHLORHEXIDINE GLUCONATE 1 APPLICATION(S): 213 SOLUTION TOPICAL at 09:33

## 2018-12-13 RX ADMIN — CLOPIDOGREL BISULFATE 75 MILLIGRAM(S): 75 TABLET, FILM COATED ORAL at 11:41

## 2018-12-13 RX ADMIN — Medication 25 MILLIGRAM(S): at 18:12

## 2018-12-13 RX ADMIN — CEFTRIAXONE 100 GRAM(S): 500 INJECTION, POWDER, FOR SOLUTION INTRAMUSCULAR; INTRAVENOUS at 16:43

## 2018-12-13 NOTE — PROGRESS NOTE ADULT - SUBJECTIVE AND OBJECTIVE BOX
24H events:    Patient is a 78y old Female who presents with a chief complaint of Chest pain (13 Dec 2018 08:31)    Primary diagnosis of Chest pain     Today is hospital day 2d. s/p angio. Awaiting viability study and CT surgery rec's.     PAST MEDICAL & SURGICAL HISTORY  Hypertension  No significant past surgical history    SOCIAL HISTORY:  Negative for smoking/alcohol/drug use.     ALLERGIES:  No Known Allergies    MEDICATIONS:  STANDING MEDICATIONS  aspirin  chewable 81 milliGRAM(s) Oral daily  atorvastatin 80 milliGRAM(s) Oral at bedtime  cefTRIAXone   IVPB 1 Gram(s) IV Intermittent every 24 hours  chlorhexidine 4% Liquid 1 Application(s) Topical <User Schedule>  clopidogrel Tablet 75 milliGRAM(s) Oral daily  heparin  Infusion 1000 Unit(s)/Hr IV Continuous <Continuous>  metoprolol tartrate 25 milliGRAM(s) Oral every 12 hours  ondansetron Injectable 4 milliGRAM(s) IV Push once  pantoprazole    Tablet 40 milliGRAM(s) Oral before breakfast    PRN MEDICATIONS    VITALS:   T(F): 98  HR: 56  BP: 91/49  RR: 16  SpO2: 98%    LABS:                        11.5   6.47  )-----------( 229      ( 13 Dec 2018 04:32 )             35.4     12-    141  |  105  |  15  ----------------------------<  97  4.4   |  24  |  1.1    Ca    9.1      13 Dec 2018 04:32  Mg     2.1     12-    TPro  6.4  /  Alb  3.8  /  TBili  0.7  /  DBili  x   /  AST  15  /  ALT  11  /  AlkPhos  85  12-13    PTT - ( 12 Dec 2018 23:35 )  PTT:34.5 sec  Urinalysis Basic - ( 11 Dec 2018 21:28 )    Color: Yellow / Appearance: Cloudy / S.010 / pH: x  Gluc: x / Ketone: Negative  / Bili: Negative / Urobili: 0.2 mg/dL   Blood: x / Protein: Negative mg/dL / Nitrite: Positive   Leuk Esterase: Small / RBC: 6-10 /HPF / WBC 6-10 /HPF   Sq Epi: x / Non Sq Epi: x / Bacteria: Many /HPF            CARDIAC MARKERS ( 12 Dec 2018 04:15 )  x     / <0.01 ng/mL / 109 U/L / x     / 2.1 ng/mL  CARDIAC MARKERS ( 12 Dec 2018 00:14 )  x     / <0.01 ng/mL / 110 U/L / x     / 2.1 ng/mL  CARDIAC MARKERS ( 11 Dec 2018 17:13 )  x     / <0.01 ng/mL / x     / x     / x          RADIOLOGY:    PHYSICAL EXAM:  GEN: No acute distress  LUNGS: Clear to auscultation bilaterally   HEART: S1/S2 present. RRR.   ABD: Soft, non-tender, non-distended. Bowel sounds present  EXT: No edema  NEURO: AAOX3    A/P    77 yo F with PMH of HTN present with substernal chest pressure relieved with rest, for the past 2 and half weeks a/w exertional dyspnea, found to have T wave bipahsic inversions anteroseptal leads. Patient was evaluated by cardio team in ED and recommends to start on heparin drip and CCU monitoring.       Typical Chest pain 2/2 Unstable angina   - Patient is currently asymptomatic and hemodynamically stable.   - 1st CE: negative.   - BNP 8394  - EKG: T wave bipahsic inversions anteroseptal leads. Wellens' pattern on ECG -->Possible Proximal LAD stenosis.  - s/p ASA 162mg, Plavix 300mg x 1 in ED.  - f/u troponin, DC heparin if trops are negative   - c/w ASA, Plavix and statin  - c/w lopressor 25mg BID   - f/u Lipid panel and HbA1c  - If Chest pain recurs --> get STAT EKG, CE and notify Cardio team.    - Cardio recs appreciated.   - will monitor in CCU.  - Awaiting viability studies and CT rec's    FELIZ or CKD?  - resolved     HTN  - c/w home meds      DVT ppx: on heparin drip  GI ppx: PPI   Activity: Bed rest for now  Diet: DASH diet     Son's number: Majo:  24H events:    Patient is a 78y old Female who presents with a chief complaint of Chest pain (13 Dec 2018 08:31)    Primary diagnosis of Chest pain     Today is hospital day 2d. s/p angio. Awaiting viability study and CT surgery rec's.     PAST MEDICAL & SURGICAL HISTORY  Hypertension  No significant past surgical history    ALLERGIES:  No Known Allergies    MEDICATIONS:  STANDING MEDICATIONS  aspirin  chewable 81 milliGRAM(s) Oral daily  atorvastatin 80 milliGRAM(s) Oral at bedtime  cefTRIAXone   IVPB 1 Gram(s) IV Intermittent every 24 hours  chlorhexidine 4% Liquid 1 Application(s) Topical <User Schedule>  clopidogrel Tablet 75 milliGRAM(s) Oral daily  heparin  Infusion 1000 Unit(s)/Hr IV Continuous <Continuous>  metoprolol tartrate 25 milliGRAM(s) Oral every 12 hours  ondansetron Injectable 4 milliGRAM(s) IV Push once  pantoprazole    Tablet 40 milliGRAM(s) Oral before breakfast    PRN MEDICATIONS    VITALS:   T(F): 98  HR: 56  BP: 91/49  RR: 16  SpO2: 98%    LABS:                        11.5   6.47  )-----------( 229      ( 13 Dec 2018 04:32 )             35.4     12-    141  |  105  |  15  ----------------------------<  97  4.4   |  24  |  1.1    Ca    9.1      13 Dec 2018 04:32  Mg     2.1     12-    TPro  6.4  /  Alb  3.8  /  TBili  0.7  /  DBili  x   /  AST  15  /  ALT  11  /  AlkPhos  85  12-13    PTT - ( 12 Dec 2018 23:35 )  PTT:34.5 sec  Urinalysis Basic - ( 11 Dec 2018 21:28 )    Color: Yellow / Appearance: Cloudy / S.010 / pH: x  Gluc: x / Ketone: Negative  / Bili: Negative / Urobili: 0.2 mg/dL   Blood: x / Protein: Negative mg/dL / Nitrite: Positive   Leuk Esterase: Small / RBC: 6-10 /HPF / WBC 6-10 /HPF   Sq Epi: x / Non Sq Epi: x / Bacteria: Many /HPF            CARDIAC MARKERS ( 12 Dec 2018 04:15 )  x     / <0.01 ng/mL / 109 U/L / x     / 2.1 ng/mL  CARDIAC MARKERS ( 12 Dec 2018 00:14 )  x     / <0.01 ng/mL / 110 U/L / x     / 2.1 ng/mL  CARDIAC MARKERS ( 11 Dec 2018 17:13 )  x     / <0.01 ng/mL / x     / x     / x          RADIOLOGY:    PHYSICAL EXAM:  GEN: No acute distress  CHEST: Clear to auscultation bilaterally   CVS: S1/S2 present. RRR.   ABD: Soft, non-tender, non-distended. Bowel sounds present  EXT: No edema  NEURO: AAOX3    A/P    79 yo F with PMH of HTN present with substernal chest pressure relieved with rest, for the past 2 and half weeks a/w exertional dyspnea, found to have T wave bipahsic inversions anteroseptal leads. Patient was evaluated by cardio team in ED and recommends to start on heparin drip and CCU monitoring.       # Ischemic Cardiomyopathy- severe 3V Disease  - Patient is currently asymptomatic and hemodynamically stable.   - CE: negative.   - BNP 8394  - EKG: T wave bipahsic inversions anteroseptal leads. Wellens' pattern on ECG -->Possible Proximal LAD stenosis.  - Cardio-  Severe 3vcad with total rca and LAD without any good collaterals to LAD nor any substantial antegrade flow. The LCX is the only patent vessels with 70% tandem lesion. If no viability and is not a candidate for surgery will do high risk PCI early next week on LCX.   - c/w ASA, Plavix and statin  - c/w lopressor 25mg BID   - f/u Lipid panel and HbA1c  - If Chest pain recurs --> get STAT EKG, CE and notify Cardio team.    - will monitor in CCU.  - Awaiting viability studies and CT rec's    # HTN  - c/w metoprolol    #UTI  - F/u cultures  - c.w ceftriaxone      DVT ppx: on heparin drip  GI ppx: PPI   Activity: Bed rest for now  Diet: DASH diet     Son's number: Majo:

## 2018-12-13 NOTE — CONSULT NOTE ADULT - SUBJECTIVE AND OBJECTIVE BOX
Surgeon: Dr. Salas    Consult requesting by: Dr. Posada   PMD: Dr. Patric Haynes     HISTORY OF PRESENT ILLNESS: 79 yo F exsmoker with PMH of HTN presented w/c/o substernal chest pressure initially relieved with rest, for the past 2 and half weeks a/w BOGGS progressing to symptoms at rest. In ED, patient was found to have T wave bipahsic inversions anteroseptal leads, TTE revealed reduced EF of 30-35% for which EP was consulted. Subsequent cardiac cath revealed: Currently, Patient is asymptomatic at rest.      NYHA functional class    [ ] Class I (no limitation) [ ] Class II (slight limitation) [ ] Class III (marked limitation) [ ] Class IV (symptoms at rest)    PAST MEDICAL & SURGICAL HISTORY:  Hypertension  No significant past surgical history      MEDICATIONS  (STANDING):  aspirin  chewable 81 milliGRAM(s) Oral daily  atorvastatin 80 milliGRAM(s) Oral at bedtime  cefTRIAXone   IVPB 1 Gram(s) IV Intermittent every 24 hours  chlorhexidine 4% Liquid 1 Application(s) Topical <User Schedule>  clopidogrel Tablet 75 milliGRAM(s) Oral daily  metoprolol tartrate 25 milliGRAM(s) Oral every 12 hours  ondansetron Injectable 4 milliGRAM(s) IV Push once  pantoprazole    Tablet 40 milliGRAM(s) Oral before breakfast    MEDICATIONS  (PRN):    Antiplatelet therapy:   clopidogrel Tablet 75 milliGRAM(s) Oral daily    Allergies    No Known Allergies    Intolerances        SOCIAL HISTORY:  Smoker: [ ] Yes  [ ] No        PACK YEARS:                         WHEN QUIT?  ETOH use: [ ] Yes  [ ] No              FREQUENCY / QUANTITY:  Ilicit Drug use:  [ ] Yes  [ ] No  Occupation:  Lives with:  Assisted device use:  5 meter walk test: 1____sec, 2____sec, 3___sec  FAMILY HISTORY:  No pertinent family history in first degree relatives      Review of Systems  CONSTITUTIONAL:  Fevers[ ] chills[ ] sweats[ ] fatigue[x ] weight loss[ ] weight gain [ ]                                      NEURO:  paresthesias[ ] seizures [ ]  syncope [ ]  confusion [ ]                                                                                NEGATIVE[ X]   EYES: glasses[ ]  blurry vision[ ]  discharge[ ] pain[ ] glaucoma [ ]                                                                          NEGATIVE[X ]   ENMT:  difficulty hearing [ ]  vertigo[ ]  dysphagia[ ] epistaxis[ ] recent dental work [ ]                                    NEGATIVE[ X]   CV:  chest pain[x ] palpitations[ ] BOGGS [x ] diaphoresis [ ]                                                                                          RESPIRATORY:  wheezing[ ] SOB[ x] cough [ ] sputum[ ] hemoptysis[ ]                                                                   GI:  nausea[ ]  vomiting [ ]  diarrhea[ ] constipation [ ] melena [ ]                                                                         NEGATIVE[ X]   : hematuria[ ]  dysuria[ ] urgency[ ] incontinence[ ]                                                                                            NEGATIVE[ X]   MUSKULOSKELETAL:  arthritis[ ]  joint swelling [ ] muscle weakness [ ] Hx vein stripping [ ]                             NEGATIVE[X ]   SKIN/BREAST:  rash[ ] itching [ ]  hair loss[ ] masses[ ]                                                                                              NEGATIVE[ X]   PSYCH:  dementia [ ] depression [ ] anxiety[ ]                                                                                                               NEGATIVE[X ]   HEME/LYMPH:  bruises easily[ ] enlarged lymph nodes[ ] tender lymph nodes[ ]                                               NEGATIVE[ X]   ENDOCRINE:  cold intolerance[ ] heat intolerance[ ] polydipsia[ ]                                                                          NEGATIVE[ X]     PHYSICAL EXAM  Vital Signs Last 24 Hrs  T(C): 36.7 (13 Dec 2018 16:00), Max: 37 (13 Dec 2018 00:00)  T(F): 98 (13 Dec 2018 16:00), Max: 98.6 (13 Dec 2018 00:00)  HR: 60 (13 Dec 2018 18:00) (52 - 66)  BP: 112/57 (13 Dec 2018 18:00) (91/49 - 130/67)  BP(mean): 78 (13 Dec 2018 18:00) (59 - 98)  RR: 16 (13 Dec 2018 18:00) (14 - 34)  SpO2: 98% (13 Dec 2018 08:00) (95% - 99%)      CONSTITUTIONAL:  WNL[x ]   Neuro: WNL [x ] Normal exam oriented to person/place & time with no focal motor or sensory  deficits. Other                     Eyes:    WNL [ x] Normal exam of conjunctiva & lids, pupils equally reactive. Other     ENT:     WNL [ x] Normal exam of nasal/oral mucosa with absence of cyanosis. Other  Neck:   WNL [x ] Normal exam of jugular veins, trachea & thyroid. Other  Chest:  WNL [x ] Normal lung exam with good air movement absence of wheezes, rales, or rhonchi: Other                                                                                CV:  Auscultation: normal [x ] S3[ ] S4[ ] Irregular [ ] Rub[ ] Clicks[ ]    Murmurs none:[x ]systolic [ ]  diastolic [ ] holosystolic [ ]  Carotids: No Bruits[x ] Other                 Abdominal Aorta: normal [ ] nonpalpable[ ]Other                                                                                      GI:           WNL[x ] Normal exam of abdomen, liver & spleen with no noted masses or tenderness. Other                                                                                                        Extremities: WNLx[ ] Normal no evidence of cyanosis or deformity Edema: none[ ]trace[ ]1+[ ]2+[ ]3+[ ]4+[ ]  Lower Extremity Pulses: Right [x ] Left [ x]Varicosities[ ]  SKIN :WNL[x ] Normal exam to inspection & palation. Other:                                                          LABS:                        11.5   6.47  )-----------( 229      ( 13 Dec 2018 04:32 )             35.4         141  |  105  |  15  ----------------------------<  97  4.4   |  24  |  1.1    Ca    9.1      13 Dec 2018 04:32  Mg     2.1         TPro  6.4  /  Alb  3.8  /  TBili  0.7  /  DBili  x   /  AST  15  /  ALT  11  /  AlkPhos  85  -    PTT - ( 12 Dec 2018 23:35 )  PTT:34.5 sec    Urinalysis Basic - ( 11 Dec 2018 21:28 )  Color: Yellow / Appearance: Cloudy / S.010 / pH: x  Gluc: x / Ketone: Negative  / Bili: Negative / Urobili: 0.2 mg/dL   Blood: x / Protein: Negative mg/dL / Nitrite: Positive   Leuk Esterase: Small / RBC: 6-10 /HPF / WBC 6-10 /HPF   Sq Epi: x / Non Sq Epi: x / Bacteria: Many /HPF    CARDIAC MARKERS ( 13 Dec 2018 10:12 )  x     / <0.01 ng/mL / 113 U/L / x     / 2.2 ng/mL  CARDIAC MARKERS ( 12 Dec 2018 04:15 )  x     / <0.01 ng/mL / 109 U/L / x     / 2.1 ng/mL  CARDIAC MARKERS ( 12 Dec 2018 00:14 )  x     / <0.01 ng/mL / 110 U/L / x     / 2.1 ng/mL    Serum Pro-Brain Natriuretic Peptide (18 @ 17:13)    Serum Pro-Brain Natriuretic Peptide: 8394 pg/mL    Xray: < from: Xray Chest 1 View- PORTABLE-Routine (18 @ 05:25) >  Impression:  No radiographic evidence of acute cardiopulmonary disease.  < end of copied text >    EKG: < from: 12 Lead ECG (18 @ 07:48) >  Ventricular Rate 53 BPM  Atrial Rate 53 BPM  P-R Interval 126 ms  QRS Duration 90 ms  Q-T Interval 506 ms  QTC Calculation(Bezet) 474 ms  P Axis 58 degrees  R Axis 15 degrees  T Axis 121 degrees  Diagnosis Line Sinus bradycardia  Anterolateral infarct , age undetermined  T wave abnormality, consider anterolateral ischemia  Abnormal ECG  Confirmed by Devin Dixon ([id number]) on 2018 9:22:13 AM  < end of copied text >    Cardiac Cath: LVEF: 30-35% by echo  Left main mild distal disease  LAD:  Proximal: 100% stenosis without late distal filling.                    Diag: supplied by collaterals from LCx  Left Circumflex:  Proximal : moderate disease  OM1:  two sequential 70% lesions  Right Cornary Artery: Proximal RCA: 100% stenosis, distal RCA supplied by collaterals from LCx    TTE: < from: Transthoracic Echocardiogram (18 @ 10:51) >  Summary:   1. Left ventricular ejection fraction, by visual estimation, is 30 to 35%.   2. Multiple left ventricular regional wall motion abnormalities exist. See wall motion findings.   3. Spectral Doppler shows impaired relaxation pattern of left ventricular myocardial filling (Grade I diastolic dysfunction).   4. Mild mitral valve regurgitation.   5. Mild tricuspid regurgitation.    PHYSICIAN INTERPRETATION:  Left Ventricle: The left ventricular internal cavity size is normal. Left   ventricular wall thickness is normal. There is no left ventricular   hypertrophy. Left ventricular ejection fraction, by visual estimation, is   30 to 35%. Spectral Doppler shows impaired relaxation pattern of left   ventricular myocardial filling (Grade I diastolic dysfunction).     LV Wall Scoring:  The entire anterior septum, mid and apical inferior septum, apical anterior  segment, apical inferior segment, and apex are hypokinetic. All remaining  scored segments are normal.    Right Ventricle: Normal right ventricular size and function.  Left Atrium: Normal left atrial size.  Right Atrium: Normal right atrial size.  Pericardium: There is no evidence of pericardial effusion.  Mitral Valve: Mild mitral valve regurgitation is seen. No evidence of   mitral stenosis.  Tricuspid Valve: Structurally normal tricuspid valve, with normal leaflet   excursion. Mild tricuspid regurgitation is visualized.  Aortic Valve: No evidence of aortic valve regurgitation is seen. No   evidence of aortic stenosis.  Pulmonic Valve: Structurally normal pulmonic valve, with normal leaflet   excursion. No indication of pulmonic valve regurgitation.  Aorta: The aortic root and ascending aorta are structurally normal, with   no evidence of dilitation.  Pulmonary Artery: The main pulmonary artery is normal in size.    < end of copied text >    STS Score: Isolated CAB  Risk of Mortality:  2.691%  Renal Failure:  1.551%  Permanent Stroke:  2.049%  Prolonged Ventilation:  10.683%  DSW Infection:  0.084%  Reoperation:  3.683%  Morbidity or Mortality:  16.343%  Short Length of Stay:  24.275%  Long Length of Stay:  6.642%    Impression:  CAD [x ]  Valvular  disease [ ]   Aortic Disease [ ]   FELIZ: Yes[ ] No [ ]   CKD Stage I [ ] , Stage II [ ] , Stage III [ ], Stage IV [ ]   Anemia: Yes [ ], No [ ]  Diabetes :Yes [ ], No [ ]  Acute MI: Yes [ ], No [ ]   Heart Failure: Yes [ ] , No [ ] HFpEF [ ], HFrEF [ ]      Assessment/ Plan: 79 yo F exsmoker with PMH of HTN presented w/c/o substernal chest pressure initially relieved with rest, for the past 2 and half weeks a/w BOGGS progressing to symptoms at rest. In ED, patient was found to have T wave bipahsic inversions anteroseptal leads, TTE revealed reduced EF of 30-35% for which EP was consulted. Subsequent cardiac cath revealed: Total LAD/RCA, OM1 70%    -Cases and plan discussed with CT surgeon Dr. Salas. Initial STS risk assessed and discussed with patient. Evaluation by full heart team pending. Attending note to follow. Pre-op for: Possible CABG     Recommendations:  [x] hold Plavix  [] hold ASA if Pre-op Cardiac Valve surgery and patient without CAD  [] hold ACEI/ARB/CCB 24 hours prior to planned procedure   [] LUE/RUE precaution for possible radial artery harvest      Labs:  [] CBC  [] CMP  [x] PT/INR/PTT  [] BNP  [x] HgA1c  [] Type and screen  [] Urinalysis  [x] MRSA     Diagnostic studies  [] CT HEAD Nonn-Contrast  [x] CT Chest with /without contrast   [x] Carotid Duplex  [x] PFT: Simple PFT [x ]  Full [ ]  [] CHRIS/PVR  [x] MRI cardiac for viability study     Consultations/Evaluations   [] Renal Consult  [] Pulmonary Consult  [] Vascular Consult  [] Dental Consult   [] Hem-Onc Consult   [] GI Consult   [] Other Consultations : Surgeon: Dr. Salas    Consult requesting by: Dr. Posada   PMD: Dr. Patric Haynes     HISTORY OF PRESENT ILLNESS: 77 yo F exsmoker with PMH of HTN presented w/c/o substernal chest pressure initially relieved with rest, for the past 2 and half weeks a/w BOGGS progressing to symptoms at rest. In ED, patient was found to have T wave bipahsic inversions anteroseptal leads, TTE revealed reduced EF of 30-35% for which EP was consulted. Subsequent cardiac cath revealed: Currently, Patient is asymptomatic at rest.    NYHA functional class    [ ] Class I (no limitation) [ ] Class II (slight limitation) [ ] Class III (marked limitation) [ ] Class IV (symptoms at rest)    PAST MEDICAL & SURGICAL HISTORY:  Hypertension  No significant past surgical history    MEDICATIONS  (STANDING):  aspirin  chewable 81 milliGRAM(s) Oral daily  atorvastatin 80 milliGRAM(s) Oral at bedtime  cefTRIAXone   IVPB 1 Gram(s) IV Intermittent every 24 hours  chlorhexidine 4% Liquid 1 Application(s) Topical <User Schedule>  clopidogrel Tablet 75 milliGRAM(s) Oral daily  metoprolol tartrate 25 milliGRAM(s) Oral every 12 hours  ondansetron Injectable 4 milliGRAM(s) IV Push once  pantoprazole    Tablet 40 milliGRAM(s) Oral before breakfast    MEDICATIONS  (PRN):    Antiplatelet therapy:   clopidogrel Tablet 75 milliGRAM(s) Oral daily    Allergies  No Known Allergies    SOCIAL HISTORY:  denies history of smoking,  smoked, so was exposed to second hand smoking  denies EtOH or drug abuse    FAMILY HISTORY:  No pertinent family history in first degree relatives    Review of Systems  CONSTITUTIONAL:  Fevers[ ] chills[ ] sweats[ ] fatigue[x ] weight loss[ ] weight gain [ ]                                      NEURO:  paresthesias[ ] seizures [ ]  syncope [ ]  confusion [ ]                                                                                NEGATIVE[ X]   EYES: glasses[ ]  blurry vision[ ]  discharge[ ] pain[ ] glaucoma [ ]                                                                          NEGATIVE[X ]   ENMT:  difficulty hearing [ ]  vertigo[ ]  dysphagia[ ] epistaxis[ ] recent dental work [ ]                                    NEGATIVE[ X]   CV:  chest pain[x ] palpitations[ ] BOGGS [x ] diaphoresis [ ]                                                                                          RESPIRATORY:  wheezing[ ] SOB[ x] cough [ ] sputum[ ] hemoptysis[ ]                                                                   GI:  nausea[ ]  vomiting [ ]  diarrhea[ ] constipation [ ] melena [ ]                                                                         NEGATIVE[ X]   : hematuria[ ]  dysuria[ ] urgency[ ] incontinence[ ]                                                                                            NEGATIVE[ X]   MUSKULOSKELETAL:  arthritis[ ]  joint swelling [ ] muscle weakness [ ] Hx vein stripping [ ]                             NEGATIVE[X ]   SKIN/BREAST:  rash[ ] itching [ ]  hair loss[ ] masses[ ]                                                                                              NEGATIVE[ X]   PSYCH:  dementia [ ] depression [ ] anxiety[ ]                                                                                                               NEGATIVE[X ]   HEME/LYMPH:  bruises easily[ ] enlarged lymph nodes[ ] tender lymph nodes[ ]                                               NEGATIVE[ X]   ENDOCRINE:  cold intolerance[ ] heat intolerance[ ] polydipsia[ ]                                                                          NEGATIVE[ X]     PHYSICAL EXAM  Vital Signs Last 24 Hrs  T(C): 36.7 (13 Dec 2018 16:00), Max: 37 (13 Dec 2018 00:00)  T(F): 98 (13 Dec 2018 16:00), Max: 98.6 (13 Dec 2018 00:00)  HR: 60 (13 Dec 2018 18:00) (52 - 66)  BP: 112/57 (13 Dec 2018 18:00) (91/49 - 130/67)  BP(mean): 78 (13 Dec 2018 18:00) (59 - 98)  RR: 16 (13 Dec 2018 18:00) (14 - 34)  SpO2: 98% (13 Dec 2018 08:00) (95% - 99%)    CONSTITUTIONAL:  WNL[x ]   Neuro: WNL [x ] Normal exam oriented to person/place & time with no focal motor or sensory  deficits. Other                     Eyes:    WNL [ x] Normal exam of conjunctiva & lids, pupils equally reactive. Other     ENT:     WNL [ x] Normal exam of nasal/oral mucosa with absence of cyanosis. Other  Neck:   WNL [x ] Normal exam of jugular veins, trachea & thyroid. Other  Chest:  WNL [x ] Normal lung exam with good air movement absence of wheezes, rales, or rhonchi: Other                                                                                CV:  Auscultation: normal [x ] S3[ ] S4[ ] Irregular [ ] Rub[ ] Clicks[ ]    Murmurs none:[x ]systolic [ ]  diastolic [ ] holosystolic [ ]  Carotids: No Bruits[x ] Other                 Abdominal Aorta: normal [ ] nonpalpable[ ]Other                                                                                      GI:           WNL[x ] Normal exam of abdomen, liver & spleen with no noted masses or tenderness. Other                                                                                                        Extremities: WNLx[ ] Normal no evidence of cyanosis or deformity Edema: none[ ]trace[ ]1+[ ]2+[ ]3+[ ]4+[ ]  Lower Extremity Pulses: Right [x ] Left [ x]Varicosities[ ]  SKIN :WNL[x ] Normal exam to inspection & palation. Other:                                                          LABS:                        11.5   6.47  )-----------( 229      ( 13 Dec 2018 04:32 )             35.4         141  |  105  |  15  ----------------------------<  97  4.4   |  24  |  1.1    Ca    9.1      13 Dec 2018 04:32  Mg     2.1         TPro  6.4  /  Alb  3.8  /  TBili  0.7  /  DBili  x   /  AST  15  /  ALT  11  /  AlkPhos  85      PTT - ( 12 Dec 2018 23:35 )  PTT:34.5 sec    Urinalysis Basic - ( 11 Dec 2018 21:28 )  Color: Yellow / Appearance: Cloudy / S.010 / pH: x  Gluc: x / Ketone: Negative  / Bili: Negative / Urobili: 0.2 mg/dL   Blood: x / Protein: Negative mg/dL / Nitrite: Positive   Leuk Esterase: Small / RBC: 6-10 /HPF / WBC 6-10 /HPF   Sq Epi: x / Non Sq Epi: x / Bacteria: Many /HPF    CARDIAC MARKERS ( 13 Dec 2018 10:12 )  x     / <0.01 ng/mL / 113 U/L / x     / 2.2 ng/mL  CARDIAC MARKERS ( 12 Dec 2018 04:15 )  x     / <0.01 ng/mL / 109 U/L / x     / 2.1 ng/mL  CARDIAC MARKERS ( 12 Dec 2018 00:14 )  x     / <0.01 ng/mL / 110 U/L / x     / 2.1 ng/mL    Serum Pro-Brain Natriuretic Peptide (18 @ 17:13)    Serum Pro-Brain Natriuretic Peptide: 8394 pg/mL    Xray: < from: Xray Chest 1 View- PORTABLE-Routine (18 @ 05:25) >  Impression:  No radiographic evidence of acute cardiopulmonary disease.  < end of copied text >    EKG: < from: 12 Lead ECG (18 @ 07:48) >  Ventricular Rate 53 BPM  Atrial Rate 53 BPM  P-R Interval 126 ms  QRS Duration 90 ms  Q-T Interval 506 ms  QTC Calculation(Bezet) 474 ms  P Axis 58 degrees  R Axis 15 degrees  T Axis 121 degrees  Diagnosis Line Sinus bradycardia  Anterolateral infarct , age undetermined  T wave abnormality, consider anterolateral ischemia  Abnormal ECG  Confirmed by Devin Dixon ([id number]) on 2018 9:22:13 AM  < end of copied text >    Cardiac Cath: LVEF: 30-35% by echo  Left main mild distal disease  LAD:  Proximal: 100% stenosis without late distal filling.                    Diag: supplied by collaterals from LCx  Left Circumflex:  Proximal : moderate disease  OM1:  two sequential 70% lesions  Right Cornary Artery: Proximal RCA: 100% stenosis, distal RCA supplied by collaterals from LCx    TTE: < from: Transthoracic Echocardiogram (18 @ 10:51) >  Summary:   1. Left ventricular ejection fraction, by visual estimation, is 30 to 35%.   2. Multiple left ventricular regional wall motion abnormalities exist. See wall motion findings.   3. Spectral Doppler shows impaired relaxation pattern of left ventricular myocardial filling (Grade I diastolic dysfunction).   4. Mild mitral valve regurgitation.   5. Mild tricuspid regurgitation.    PHYSICIAN INTERPRETATION:  Left Ventricle: The left ventricular internal cavity size is normal. Left   ventricular wall thickness is normal. There is no left ventricular   hypertrophy. Left ventricular ejection fraction, by visual estimation, is   30 to 35%. Spectral Doppler shows impaired relaxation pattern of left   ventricular myocardial filling (Grade I diastolic dysfunction).     LV Wall Scoring:  The entire anterior septum, mid and apical inferior septum, apical anterior  segment, apical inferior segment, and apex are hypokinetic. All remaining  scored segments are normal.    Right Ventricle: Normal right ventricular size and function.  Left Atrium: Normal left atrial size.  Right Atrium: Normal right atrial size.  Pericardium: There is no evidence of pericardial effusion.  Mitral Valve: Mild mitral valve regurgitation is seen. No evidence of   mitral stenosis.  Tricuspid Valve: Structurally normal tricuspid valve, with normal leaflet   excursion. Mild tricuspid regurgitation is visualized.  Aortic Valve: No evidence of aortic valve regurgitation is seen. No   evidence of aortic stenosis.  Pulmonic Valve: Structurally normal pulmonic valve, with normal leaflet   excursion. No indication of pulmonic valve regurgitation.  Aorta: The aortic root and ascending aorta are structurally normal, with   no evidence of dilitation.  Pulmonary Artery: The main pulmonary artery is normal in size.    < end of copied text >    STS Score: Isolated CAB  Risk of Mortality:  2.691%  Renal Failure:  1.551%  Permanent Stroke:  2.049%  Prolonged Ventilation:  10.683%  DSW Infection:  0.084%  Reoperation:  3.683%  Morbidity or Mortality:  16.343%  Short Length of Stay:  24.275%  Long Length of Stay:  6.642%    Impression:  CAD [x ]  Valvular  disease [ ]   Aortic Disease [ ]   FELIZ: Yes[ ] No [ ]   CKD Stage I [ ] , Stage II [ ] , Stage III [ ], Stage IV [ ]   Anemia: Yes [ ], No [ ]  Diabetes :Yes [ ], No [ ]  Acute MI: Yes [ ], No [ ]   Heart Failure: Yes [ ] , No [ ] HFpEF [ ], HFrEF [ ]      Assessment/ Plan: 77 yo F exsmoker with PMH of HTN presented w/c/o substernal chest pressure initially relieved with rest, for the past 2 and half weeks a/w BOGGS progressing to symptoms at rest. In ED, patient was found to have T wave bipahsic inversions anteroseptal leads, TTE revealed reduced EF of 30-35% for which EP was consulted. Subsequent cardiac cath revealed: Total LAD/RCA, OM1 70%    -Cases and plan discussed with CT surgeon Dr. Salas. Initial STS risk assessed and discussed with patient. Evaluation by full heart team pending. Attending note to follow. Pre-op for: Possible CABG     Recommendations:  [x] hold Plavix  [] hold ASA if Pre-op Cardiac Valve surgery and patient without CAD  [] hold ACEI/ARB/CCB 24 hours prior to planned procedure   [] LUE/RUE precaution for possible radial artery harvest      Labs:  [] CBC  [] CMP  [x] PT/INR/PTT  [] BNP  [x] HgA1c  [] Type and screen  [] Urinalysis  [x] MRSA     Diagnostic studies  [] CT HEAD Nonn-Contrast  [x] CT Chest with /without contrast   [x] Carotid Duplex  [x] PFT: Simple PFT [x ]  Full [ ]  [] CHRIS/PVR  [x] MRI cardiac for viability study     Consultations/Evaluations   [] Renal Consult  [] Pulmonary Consult  [] Vascular Consult  [] Dental Consult   [] Hem-Onc Consult   [] GI Consult   [] Other Consultations : Surgeon: Dr. Salas    Consult requesting by: Dr. Posada   PMD: Dr. Patric Haynes     HISTORY OF PRESENT ILLNESS: 77 yo F exsmoker with PMH of HTN presented w/c/o substernal chest pressure initially relieved with rest, for the past 2 and half weeks a/w BOGGS progressing to symptoms at rest. In ED, patient was found to have T wave bipahsic inversions anteroseptal leads, TTE revealed reduced EF of 30-35% for which EP was consulted. Subsequent cardiac cath revealed: Currently, Patient is asymptomatic at rest.    NYHA functional class    [ ] Class I (no limitation) [ ] Class II (slight limitation) [ ] Class III (marked limitation) [ ] Class IV (symptoms at rest)    PAST MEDICAL & SURGICAL HISTORY:  Hypertension  No significant past surgical history    MEDICATIONS  (STANDING):  aspirin  chewable 81 milliGRAM(s) Oral daily  atorvastatin 80 milliGRAM(s) Oral at bedtime  cefTRIAXone   IVPB 1 Gram(s) IV Intermittent every 24 hours  chlorhexidine 4% Liquid 1 Application(s) Topical <User Schedule>  clopidogrel Tablet 75 milliGRAM(s) Oral daily  metoprolol tartrate 25 milliGRAM(s) Oral every 12 hours  ondansetron Injectable 4 milliGRAM(s) IV Push once  pantoprazole    Tablet 40 milliGRAM(s) Oral before breakfast    MEDICATIONS  (PRN):    Antiplatelet therapy:   clopidogrel Tablet 75 milliGRAM(s) Oral daily    Allergies  No Known Allergies    SOCIAL HISTORY:  denies history of smoking,  smoked, so was exposed to second hand smoking  denies EtOH or drug abuse    5M walk: T1: 4.4s/ T2: 4.6s/ T3: 5.1    FAMILY HISTORY:  No pertinent family history in first degree relatives    Review of Systems  CONSTITUTIONAL:  Fevers[ ] chills[ ] sweats[ ] fatigue[x ] weight loss[ ] weight gain [ ]                                      NEURO:  paresthesias[ ] seizures [ ]  syncope [ ]  confusion [ ]                                                                                NEGATIVE[ X]   EYES: glasses[ ]  blurry vision[ ]  discharge[ ] pain[ ] glaucoma [ ]                                                                          NEGATIVE[X ]   ENMT:  difficulty hearing [ ]  vertigo[ ]  dysphagia[ ] epistaxis[ ] recent dental work [ ]                                    NEGATIVE[ X]   CV:  chest pain[x ] palpitations[ ] BOGGS [x ] diaphoresis [ ]                                                                                          RESPIRATORY:  wheezing[ ] SOB[ x] cough [ ] sputum[ ] hemoptysis[ ]                                                                   GI:  nausea[ ]  vomiting [ ]  diarrhea[ ] constipation [ ] melena [ ]                                                                         NEGATIVE[ X]   : hematuria[ ]  dysuria[ ] urgency[ ] incontinence[ ]                                                                                            NEGATIVE[ X]   MUSKULOSKELETAL:  arthritis[ ]  joint swelling [ ] muscle weakness [ ] Hx vein stripping [ ]                             NEGATIVE[X ]   SKIN/BREAST:  rash[ ] itching [ ]  hair loss[ ] masses[ ]                                                                                              NEGATIVE[ X]   PSYCH:  dementia [ ] depression [ ] anxiety[ ]                                                                                                               NEGATIVE[X ]   HEME/LYMPH:  bruises easily[ ] enlarged lymph nodes[ ] tender lymph nodes[ ]                                               NEGATIVE[ X]   ENDOCRINE:  cold intolerance[ ] heat intolerance[ ] polydipsia[ ]                                                                          NEGATIVE[ X]     PHYSICAL EXAM  Vital Signs Last 24 Hrs  T(C): 36.7 (13 Dec 2018 16:00), Max: 37 (13 Dec 2018 00:00)  T(F): 98 (13 Dec 2018 16:00), Max: 98.6 (13 Dec 2018 00:00)  HR: 60 (13 Dec 2018 18:00) (52 - 66)  BP: 112/57 (13 Dec 2018 18:00) (91/49 - 130/67)  BP(mean): 78 (13 Dec 2018 18:00) (59 - 98)  RUE: 93/44       LUE: 99/54  RR: 16 (13 Dec 2018 18:00) (14 - 34)  SpO2: 98% (13 Dec 2018 08:00) (95% - 99%)    CONSTITUTIONAL:  WNL[x ]   Neuro: WNL [x ] Normal exam oriented to person/place & time with no focal motor or sensory  deficits. Other                     Eyes:    WNL [ x] Normal exam of conjunctiva & lids, pupils equally reactive. Other     ENT:     WNL [ x] Normal exam of nasal/oral mucosa with absence of cyanosis. Other  Neck:   WNL [x ] Normal exam of jugular veins, trachea & thyroid. Other  Chest:  WNL [x ] Normal lung exam with good air movement absence of wheezes, rales, or rhonchi: Other                                                                                CV:  Auscultation: normal [x ] S3[ ] S4[ ] Irregular [ ] Rub[ ] Clicks[ ]    Murmurs none:[x ]systolic [ ]  diastolic [ ] holosystolic [ ]  Carotids: No Bruits[x ] Other                 Abdominal Aorta: normal [ ] nonpalpable[ ]Other                                                                                      GI:           WNL[x ] Normal exam of abdomen, liver & spleen with no noted masses or tenderness. Other                                                                                                        Extremities: WNLx[ ] Normal no evidence of cyanosis or deformity Edema: none[ ]trace[ ]1+[ ]2+[ ]3+[ ]4+[ ]  Lower Extremity Pulses: Right [x ] Left [ x]Varicosities[ ]  SKIN :WNL[x ] Normal exam to inspection & palation. Other:                                                          LABS:                        11.5   6.47  )-----------( 229      ( 13 Dec 2018 04:32 )             35.4         141  |  105  |  15  ----------------------------<  97  4.4   |  24  |  1.1    Ca    9.1      13 Dec 2018 04:32  Mg     2.1         TPro  6.4  /  Alb  3.8  /  TBili  0.7  /  DBili  x   /  AST  15  /  ALT  11  /  AlkPhos  85      PTT - ( 12 Dec 2018 23:35 )  PTT:34.5 sec    Urinalysis Basic - ( 11 Dec 2018 21:28 )  Color: Yellow / Appearance: Cloudy / S.010 / pH: x  Gluc: x / Ketone: Negative  / Bili: Negative / Urobili: 0.2 mg/dL   Blood: x / Protein: Negative mg/dL / Nitrite: Positive   Leuk Esterase: Small / RBC: 6-10 /HPF / WBC 6-10 /HPF   Sq Epi: x / Non Sq Epi: x / Bacteria: Many /HPF    CARDIAC MARKERS ( 13 Dec 2018 10:12 )  x     / <0.01 ng/mL / 113 U/L / x     / 2.2 ng/mL  CARDIAC MARKERS ( 12 Dec 2018 04:15 )  x     / <0.01 ng/mL / 109 U/L / x     / 2.1 ng/mL  CARDIAC MARKERS ( 12 Dec 2018 00:14 )  x     / <0.01 ng/mL / 110 U/L / x     / 2.1 ng/mL    Serum Pro-Brain Natriuretic Peptide (18 @ 17:13)    Serum Pro-Brain Natriuretic Peptide: 8394 pg/mL    Xray: < from: Xray Chest 1 View- PORTABLE-Routine (18 @ 05:25) >  Impression:  No radiographic evidence of acute cardiopulmonary disease.  < end of copied text >    EKG: < from: 12 Lead ECG (18 @ 07:48) >  Ventricular Rate 53 BPM  Atrial Rate 53 BPM  P-R Interval 126 ms  QRS Duration 90 ms  Q-T Interval 506 ms  QTC Calculation(Bezet) 474 ms  P Axis 58 degrees  R Axis 15 degrees  T Axis 121 degrees  Diagnosis Line Sinus bradycardia  Anterolateral infarct , age undetermined  T wave abnormality, consider anterolateral ischemia  Abnormal ECG  Confirmed by Devin Dixon ([id number]) on 2018 9:22:13 AM  < end of copied text >    Cardiac Cath: LVEF: 30-35% by echo  Left main mild distal disease  LAD:  Proximal: 100% stenosis without late distal filling.                    Diag: supplied by collaterals from LCx  Left Circumflex:  Proximal : moderate disease  OM1:  two sequential 70% lesions  Right Cornary Artery: Proximal RCA: 100% stenosis, distal RCA supplied by collaterals from LCx    TTE: < from: Transthoracic Echocardiogram (18 @ 10:51) >  Summary:   1. Left ventricular ejection fraction, by visual estimation, is 30 to 35%.   2. Multiple left ventricular regional wall motion abnormalities exist. See wall motion findings.   3. Spectral Doppler shows impaired relaxation pattern of left ventricular myocardial filling (Grade I diastolic dysfunction).   4. Mild mitral valve regurgitation.   5. Mild tricuspid regurgitation.    PHYSICIAN INTERPRETATION:  Left Ventricle: The left ventricular internal cavity size is normal. Left   ventricular wall thickness is normal. There is no left ventricular   hypertrophy. Left ventricular ejection fraction, by visual estimation, is   30 to 35%. Spectral Doppler shows impaired relaxation pattern of left   ventricular myocardial filling (Grade I diastolic dysfunction).     LV Wall Scoring:  The entire anterior septum, mid and apical inferior septum, apical anterior  segment, apical inferior segment, and apex are hypokinetic. All remaining  scored segments are normal.    Right Ventricle: Normal right ventricular size and function.  Left Atrium: Normal left atrial size.  Right Atrium: Normal right atrial size.  Pericardium: There is no evidence of pericardial effusion.  Mitral Valve: Mild mitral valve regurgitation is seen. No evidence of   mitral stenosis.  Tricuspid Valve: Structurally normal tricuspid valve, with normal leaflet   excursion. Mild tricuspid regurgitation is visualized.  Aortic Valve: No evidence of aortic valve regurgitation is seen. No   evidence of aortic stenosis.  Pulmonic Valve: Structurally normal pulmonic valve, with normal leaflet   excursion. No indication of pulmonic valve regurgitation.  Aorta: The aortic root and ascending aorta are structurally normal, with   no evidence of dilitation.  Pulmonary Artery: The main pulmonary artery is normal in size.    < end of copied text >    STS Score: Isolated CAB  Risk of Mortality:  2.691%  Renal Failure:  1.551%  Permanent Stroke:  2.049%  Prolonged Ventilation:  10.683%  DSW Infection:  0.084%  Reoperation:  3.683%  Morbidity or Mortality:  16.343%  Short Length of Stay:  24.275%  Long Length of Stay:  6.642%    Impression:  CAD [x ]  Valvular  disease [ ]   Aortic Disease [ ]   FELIZ: Yes[ ] No [ ]   CKD Stage I [ ] , Stage II [ ] , Stage III [ ], Stage IV [ ]   Anemia: Yes [ ], No [ ]  Diabetes :Yes [ ], No [ ]  Acute MI: Yes [ ], No [ ]   Heart Failure: Yes [ ] , No [ ] HFpEF [ ], HFrEF [ ]      Assessment/ Plan: 77 yo F exsmoker with PMH of HTN presented w/c/o substernal chest pressure initially relieved with rest, for the past 2 and half weeks a/w BOGGS progressing to symptoms at rest. In ED, patient was found to have T wave bipahsic inversions anteroseptal leads, TTE revealed reduced EF of 30-35% for which EP was consulted. Subsequent cardiac cath revealed: Total LAD/RCA, OM1 70%    -Cases and plan discussed with CT surgeon Dr. Salas. Initial STS risk assessed and discussed with patient. Evaluation by full heart team pending. Attending note to follow. Pre-op for: Possible CABG     Recommendations:  [x] hold Plavix  [] hold ASA if Pre-op Cardiac Valve surgery and patient without CAD  [] hold ACEI/ARB/CCB 24 hours prior to planned procedure   [] LUE/RUE precaution for possible radial artery harvest      Labs:  [] CBC  [] CMP  [x] PT/INR/PTT  [] BNP  [x] HgA1c  [] Type and screen  [] Urinalysis  [x] MRSA     Diagnostic studies  [] CT HEAD Nonn-Contrast  [x] CT Chest with /without contrast   [x] Carotid Duplex  [x] PFT: Simple PFT [x ]  Full [ ]  [] CHRIS/PVR  [x] MRI cardiac for viability study     Consultations/Evaluations   [] Renal Consult  [] Pulmonary Consult  [] Vascular Consult  [] Dental Consult   [] Hem-Onc Consult   [] GI Consult   [] Other Consultations :

## 2018-12-13 NOTE — CONSULT NOTE ADULT - ATTENDING COMMENTS
agree
79 yo F exsmoker with PMH of HTN presented w/c/o substernal chest pressure initially relieved with rest, for the past 2 and half weeks a/w BOGGS progressing to symptoms at rest. Patient's w/u revealed severe TVD, congestive heart failure (systolic, with EF of 30-35%).   CT surgery was asked to evaluate patient for surgical revascularization  Patient's chart, imaging were reviewed  Myocardial viability should be assessed with cardiac MRI  If viabile, patient should undergo surgical revascularization  case d/w Dr. Posada  preop w/u in progress  will follow      Cards: Dr. Posada  EP: Dr. Christophe Zabala  PMD: Patric Haynes MD    Preop w/u  LHC:   Left main mild distal disease  LAD:  Proximal: 100% stenosis without late distal filling.                    Diag: supplied by collaterals from LCx  Left Circumflex:  Proximal : moderate disease  OM1:  two sequential 70% lesions  Right Coronary Artery: Proximal RCA: 100% stenosis, distal RCA supplied by collaterals from LCx    TTE: < from: Transthoracic Echocardiogram (12.12.18 @ 10:51) >  EF 30-35%, multiple LV regional WMA, mild MR, TR

## 2018-12-13 NOTE — CONSULT NOTE ADULT - SUBJECTIVE AND OBJECTIVE BOX
Date of Admission: 12/10    CHIEF COMPLAINT: chest pain    HISTORY OF PRESENT ILLNESS: 78yFemale with PMH below presented to the hospital for chest pain of recent duration. She states that she had pain prior to coming to the hospital for the previous two weeks. She denies prior symptoms and went to her primary medical doctor where she had an abnormal EKG and was sent into the hospital for further evaluation. She had cardiac cath yesterday which revealed severe 3V disease and an echo which showed reduced EF. Denies SOB    PAST MEDICAL & SURGICAL HISTORY:  Hypertension  No significant past surgical history    HEALTH ISSUES - PROBLEM Dx:        FAMILY HISTORY:  No pertinent family history in first degree relatives    None [ ]  Mother:   Father:   Siblings:     SOCIAL HISTORY:    [ ] Non-smoker  [ ] Smoker  [ ] Alcohol    Allergies    No Known Allergies    Intolerances    	    REVIEW OF SYSTEMS:  CONSTITUTIONAL: No fever, weight loss, or fatigue  CARDIOLOGY:see HPI  RESPIRATORY: denies shortness of breath, wheezeing.   NEUROLOGICAL: NO weakness, no focal deficits to report.  ENDOCRINOLOGICAL: no recent change in diabetic medications.   GI: no BRBPR, no N,V,diarrhea.    PSYCHIATRY: normal mood and affect  HEENT: no nasal discharge, no ecchymosis  SKIN: no ecchymosis, no breakdown  MUSCULOSKELETAL: Full range of motion x4.      PHYSICAL EXAM:  T(C): 36.7 (12-13-18 @ 08:00), Max: 37 (12-13-18 @ 00:00)  HR: 52 (12-13-18 @ 08:00) (50 - 66)  BP: 111/66 (12-13-18 @ 08:00) (95/57 - 126/74)  RR: 14 (12-13-18 @ 08:00) (14 - 34)  SpO2: 98% (12-13-18 @ 08:00) (95% - 99%)  Wt(kg): --  I&O's Summary    12 Dec 2018 07:01  -  13 Dec 2018 07:00  --------------------------------------------------------  IN: 604 mL / OUT: 1375 mL / NET: -771 mL      Daily     Daily     General Appearance: elderly woman	  Cardiovascular: Normal S1 S2, No JVD, No murmurs, No edema  Respiratory: Lungs clear to auscultation	  Psychiatry: A & O x 3, Mood & affect appropriate  Gastrointestinal:  Soft, Non-tender  Skin: No rashes, No ecchymoses, No cyanosis	  Neurologic: Non-focal  Musculoskeletal/ extremities: Normal range of motion, No clubbing, cyanosis or edema  Vascular: Peripheral pulses palpable 2+ bilaterally    LABS:	 	                          11.5   6.47  )-----------( 229      ( 13 Dec 2018 04:32 )             35.4     12-13    141  |  105  |  15  ----------------------------<  97  4.4   |  24  |  1.1    Ca    9.1      13 Dec 2018 04:32  Mg     2.1     12-13    TPro  6.4  /  Alb  3.8  /  TBili  0.7  /  DBili  x   /  AST  15  /  ALT  11  /  AlkPhos  85  12-13    CARDIAC MARKERS ( 12 Dec 2018 04:15 )  x     / <0.01 ng/mL / 109 U/L / x     / 2.1 ng/mL  CARDIAC MARKERS ( 12 Dec 2018 00:14 )  x     / <0.01 ng/mL / 110 U/L / x     / 2.1 ng/mL  CARDIAC MARKERS ( 11 Dec 2018 17:13 )  x     / <0.01 ng/mL / x     / x     / x          PTT - ( 12 Dec 2018 23:35 )  PTT:34.5 sec    CARDIAC MARKERS:            TELEMETRY EVENTS: 	    ECG: < from: 12 Lead ECG (12.12.18 @ 07:46) >    Diagnosis Line Sinus bradycardia  Anteroseptal infarct , age undetermined  T wave abnormality, consider lateral ischemia  Abnormal ECG    < end of copied text >   	  RADIOLOGY:  OTHER: 	    PREVIOUS DIAGNOSTIC TESTING:    [x ] Echocardiogram: < from: Transthoracic Echocardiogram (12.12.18 @ 10:51) >   1. Left ventricular ejection fraction, by visual estimation, is 30 to   35%.   2. Multiple left ventricular regional wall motion abnormalities exist.   See wall motion findings.   3. Spectral Doppler shows impaired relaxation pattern of left   ventricular myocardial filling (Grade I diastolic dysfunction).   4. Mild mitral valve regurgitation.   5. Mild tricuspid regurgitation.    < end of copied text >    [x ]  Catheterization: LAD  with collaterals from circ, RCA  with collaterals from circ, circ has sequential 70% lesions.   [ ] Stress Test:  	  	    Home Medications:    MEDICATIONS  (STANDING):  aspirin  chewable 81 milliGRAM(s) Oral daily  atorvastatin 80 milliGRAM(s) Oral at bedtime  cefTRIAXone   IVPB 1 Gram(s) IV Intermittent every 24 hours  chlorhexidine 4% Liquid 1 Application(s) Topical <User Schedule>  clopidogrel Tablet 75 milliGRAM(s) Oral daily  heparin  Infusion 1000 Unit(s)/Hr (10 mL/Hr) IV Continuous <Continuous>  metoprolol tartrate 25 milliGRAM(s) Oral every 12 hours  ondansetron Injectable 4 milliGRAM(s) IV Push once  pantoprazole    Tablet 40 milliGRAM(s) Oral before breakfast    MEDICATIONS  (PRN):

## 2018-12-13 NOTE — CONSULT NOTE ADULT - ASSESSMENT
Ischemic Cardiomyopathy- severe 3V Dz  - awaiting viability study and CT surgery input for best revascularization technique- walls thick on echo, likely viable tissue.   - given low EF and 3V Dz, complete revascularization with CT surgery has survival and morbidity benefit.   - many variables in management of this case  - if LV viable and CT surgery does full revascularization, indication for short term life vest  - if incomplete revascularization, also indication for short term life vest v. ventricular stimulation for AICD  - if medical therapy, indication for life-vest v AICD  - will await input from CT surgery team and revascularization decision Ischemic Cardiomyopathy- severe 3V Disease  - awaiting viability study and CT surgery input for best revascularization technique- walls thick on echo, likely viable tissue.   - given low EF (LVEF 30-35%) and 3V Dz, complete revascularization with CT surgery has survival and morbidity benefit.   - many variables in management of this case  - if LV viable and CT surgery does full revascularization, indication for short term life vest  - if incomplete revascularization, also indication for short term life vest   - will await input from CT surgery team and revascularization decision

## 2018-12-14 LAB
ALBUMIN SERPL ELPH-MCNC: 3.5 G/DL — SIGNIFICANT CHANGE UP (ref 3.5–5.2)
ALP SERPL-CCNC: 88 U/L — SIGNIFICANT CHANGE UP (ref 30–115)
ALT FLD-CCNC: 12 U/L — SIGNIFICANT CHANGE UP (ref 0–41)
ANION GAP SERPL CALC-SCNC: 14 MMOL/L — SIGNIFICANT CHANGE UP (ref 7–14)
APTT BLD: 31.5 SEC — SIGNIFICANT CHANGE UP (ref 27–39.2)
AST SERPL-CCNC: 17 U/L — SIGNIFICANT CHANGE UP (ref 0–41)
BILIRUB SERPL-MCNC: 0.4 MG/DL — SIGNIFICANT CHANGE UP (ref 0.2–1.2)
BUN SERPL-MCNC: 18 MG/DL — SIGNIFICANT CHANGE UP (ref 10–20)
CALCIUM SERPL-MCNC: 9.1 MG/DL — SIGNIFICANT CHANGE UP (ref 8.5–10.1)
CHLORIDE SERPL-SCNC: 109 MMOL/L — SIGNIFICANT CHANGE UP (ref 98–110)
CO2 SERPL-SCNC: 21 MMOL/L — SIGNIFICANT CHANGE UP (ref 17–32)
CREAT SERPL-MCNC: 1.1 MG/DL — SIGNIFICANT CHANGE UP (ref 0.7–1.5)
ERYTHROCYTE [SEDIMENTATION RATE] IN BLOOD: 3 MM/HR — SIGNIFICANT CHANGE UP (ref 0–20)
GLUCOSE SERPL-MCNC: 106 MG/DL — HIGH (ref 70–99)
HCT VFR BLD CALC: 35.2 % — LOW (ref 37–47)
HGB BLD-MCNC: 11.6 G/DL — LOW (ref 12–16)
INR BLD: 1.04 RATIO — SIGNIFICANT CHANGE UP (ref 0.65–1.3)
MAGNESIUM SERPL-MCNC: 2 MG/DL — SIGNIFICANT CHANGE UP (ref 1.8–2.4)
MCHC RBC-ENTMCNC: 28.6 PG — SIGNIFICANT CHANGE UP (ref 27–31)
MCHC RBC-ENTMCNC: 33 G/DL — SIGNIFICANT CHANGE UP (ref 32–37)
MCV RBC AUTO: 86.9 FL — SIGNIFICANT CHANGE UP (ref 81–99)
NRBC # BLD: 0 /100 WBCS — SIGNIFICANT CHANGE UP (ref 0–0)
PLATELET # BLD AUTO: 231 K/UL — SIGNIFICANT CHANGE UP (ref 130–400)
POTASSIUM SERPL-MCNC: 4.5 MMOL/L — SIGNIFICANT CHANGE UP (ref 3.5–5)
POTASSIUM SERPL-SCNC: 4.5 MMOL/L — SIGNIFICANT CHANGE UP (ref 3.5–5)
PROT SERPL-MCNC: 6 G/DL — SIGNIFICANT CHANGE UP (ref 6–8)
PROTHROM AB SERPL-ACNC: 11.9 SEC — SIGNIFICANT CHANGE UP (ref 9.95–12.87)
RBC # BLD: 4.05 M/UL — LOW (ref 4.2–5.4)
RBC # FLD: 13.3 % — SIGNIFICANT CHANGE UP (ref 11.5–14.5)
SODIUM SERPL-SCNC: 144 MMOL/L — SIGNIFICANT CHANGE UP (ref 135–146)
WBC # BLD: 7.56 K/UL — SIGNIFICANT CHANGE UP (ref 4.8–10.8)
WBC # FLD AUTO: 7.56 K/UL — SIGNIFICANT CHANGE UP (ref 4.8–10.8)

## 2018-12-14 PROCEDURE — 93880 EXTRACRANIAL BILAT STUDY: CPT | Mod: 26

## 2018-12-14 RX ORDER — LISINOPRIL 2.5 MG/1
2.5 TABLET ORAL DAILY
Qty: 0 | Refills: 0 | Status: DISCONTINUED | OUTPATIENT
Start: 2018-12-14 | End: 2018-12-22

## 2018-12-14 RX ORDER — HEPARIN SODIUM 5000 [USP'U]/ML
5000 INJECTION INTRAVENOUS; SUBCUTANEOUS EVERY 8 HOURS
Qty: 0 | Refills: 0 | Status: DISCONTINUED | OUTPATIENT
Start: 2018-12-14 | End: 2018-12-22

## 2018-12-14 RX ADMIN — ATORVASTATIN CALCIUM 80 MILLIGRAM(S): 80 TABLET, FILM COATED ORAL at 22:13

## 2018-12-14 RX ADMIN — CEFTRIAXONE 100 GRAM(S): 500 INJECTION, POWDER, FOR SOLUTION INTRAMUSCULAR; INTRAVENOUS at 16:53

## 2018-12-14 RX ADMIN — HEPARIN SODIUM 5000 UNIT(S): 5000 INJECTION INTRAVENOUS; SUBCUTANEOUS at 22:13

## 2018-12-14 RX ADMIN — Medication 25 MILLIGRAM(S): at 17:28

## 2018-12-14 RX ADMIN — PANTOPRAZOLE SODIUM 40 MILLIGRAM(S): 20 TABLET, DELAYED RELEASE ORAL at 07:28

## 2018-12-14 RX ADMIN — Medication 81 MILLIGRAM(S): at 11:12

## 2018-12-14 NOTE — PROGRESS NOTE ADULT - ATTENDING COMMENTS
79 yo F with h/o second hand smoker, HTN, presented with substernal chest pressure initially relieved with rest, for the past 2 and half weeks a/w BOGGS progressing to symptoms at rest. Patient's w/u revealed severe TVD, congestive heart failure (systolic, with EF of 30-35%).   CT surgery was asked to evaluate patient for surgical revascularization  Patient's chart, imaging were reviewed  Myocardial viability was assessed with cardiac MRI  There is no viability in LAD territory as per Dr. Bond (Awaiting for the final report)  will discuss the case with cardiology over the weekend and decide on the next step        Cards: Dr. Posada  EP: Dr. Christophe Zabala  PMD: Patric Haynes MD    Preop w/u  LHC:   Left main mild distal disease  LAD:  Proximal: 100% stenosis without late distal filling.                    Diag: supplied by collaterals from LCx  Left Circumflex:  Proximal : moderate disease  OM1:  two sequential 70% lesions  Right Coronary Artery: Proximal RCA: 100% stenosis, distal RCA supplied by collaterals from LCx    TTE: < from: Transthoracic Echocardiogram (12.12.18 @ 10:51) >  EF 30-35%, multiple LV regional WMA, mild MR, TR

## 2018-12-14 NOTE — CHART NOTE - NSCHARTNOTEFT_GEN_A_CORE
Pt is pre-op for cabg and is scheduled for cardiac MRI to assess viability today at 3 pm.  We will continue to follow her and cont with pre-op workup.

## 2018-12-14 NOTE — PROGRESS NOTE ADULT - SUBJECTIVE AND OBJECTIVE BOX
SUBJ:  Pt stable.   MRI with no viability anterior wall.  Pt stable on meds.       MEDICATIONS  (STANDING):  aspirin  chewable 81 milliGRAM(s) Oral daily  atorvastatin 80 milliGRAM(s) Oral at bedtime  cefTRIAXone   IVPB 1 Gram(s) IV Intermittent every 24 hours  chlorhexidine 4% Liquid 1 Application(s) Topical <User Schedule>  metoprolol tartrate 25 milliGRAM(s) Oral every 12 hours  ondansetron Injectable 4 milliGRAM(s) IV Push once  pantoprazole    Tablet 40 milliGRAM(s) Oral before breakfast    MEDICATIONS  (PRN):            Vital Signs Last 24 Hrs  T(C): 35.7 (14 Dec 2018 16:58), Max: 36.4 (13 Dec 2018 20:00)  T(F): 96.2 (14 Dec 2018 16:58), Max: 97.6 (13 Dec 2018 20:00)  HR: 60 (14 Dec 2018 18:00) (50 - 68)  BP: 113/62 (14 Dec 2018 18:00) (98/49 - 131/63)  BP(mean): 92 (14 Dec 2018 18:00) (69 - 96)  RR: 34 (14 Dec 2018 18:00) (13 - 51)  SpO2: 99% (14 Dec 2018 18:00) (96% - 99%)     REVIEW OF SYSTEMS:  CONSTITUTIONAL: No fever, weight loss, or fatigue  CARDIOLOGY: PAtient denies chest pain, shortness of breath or syncopal episodes.   RESPIRATORY: denies shortness of breath, wheezeing.   NEUROLOGICAL: NO weakness, no focal deficits to report.  ENDOCRINOLOGICAL: no recent change in diabetic medications.   GI: no BRBPR, no N,V,diarrhea.    PSYCHIATRY: normal mood and affect  HEENT: no nasal discharge, no ecchymosis  SKIN: no ecchymosis, no breakdown  MUSCULOSKELETAL: Full range of motion x4.        PHYSICAL EXAM:  · CONSTITUTIONAL:	Well-developed, well nourished    BMI-  ·RESPIRATORY:   airway patent; breath sounds equal; good air movement; respirations non-labored; clear to auscultation bilaterally; no chest wall tenderness; no intercostal retractions; no rales,rhonchi or wheeze  · CARDIOVASCULAR	regular rate and rhythm  no rub  no murmur  normal PMI  · EXTREMITIES: No cyanosis, clubbing or edema  · VASCULAR: 	Equal and normal pulses (carotid, femoral, dorsalis pedis)  	  TELEMETRY:    ECG:    TTE:    LABS:                        11.6   7.56  )-----------( 231      ( 14 Dec 2018 04:09 )             35.2     12-14    144  |  109  |  18  ----------------------------<  106<H>  4.5   |  21  |  1.1    Ca    9.1      14 Dec 2018 04:09  Mg     2.0     12-14    TPro  6.0  /  Alb  3.5  /  TBili  0.4  /  DBili  x   /  AST  17  /  ALT  12  /  AlkPhos  88  12-14    CARDIAC MARKERS ( 13 Dec 2018 10:12 )  x     / <0.01 ng/mL / 113 U/L / x     / 2.2 ng/mL      PT/INR - ( 14 Dec 2018 04:09 )   PT: 11.90 sec;   INR: 1.04 ratio         PTT - ( 14 Dec 2018 04:09 )  PTT:31.5 sec    I&O's Summary    13 Dec 2018 07:01  -  14 Dec 2018 07:00  --------------------------------------------------------  IN: 840 mL / OUT: 1350 mL / NET: -510 mL      BNP  RADIOLOGY & ADDITIONAL STUDIES:    IMPRESSION AND PLAN:    Non viable anterior wall.   add lisinopril 2.5 mg daily.     Will review with CT surgery.   Will evlauate for CHIPS case and interventin of LCX with impella support.

## 2018-12-14 NOTE — PROGRESS NOTE ADULT - SUBJECTIVE AND OBJECTIVE BOX
24H events:    Patient is a 78y old Female who presents with a chief complaint of Chest pain (13 Dec 2018 18:51)    Primary diagnosis of Chest pain     Today is hospital day 3d.     PAST MEDICAL & SURGICAL HISTORY  Hypertension  No significant past surgical history    SOCIAL HISTORY:  Negative for smoking/alcohol/drug use.     ALLERGIES:  No Known Allergies    MEDICATIONS:  STANDING MEDICATIONS  aspirin  chewable 81 milliGRAM(s) Oral daily  atorvastatin 80 milliGRAM(s) Oral at bedtime  cefTRIAXone   IVPB 1 Gram(s) IV Intermittent every 24 hours  chlorhexidine 4% Liquid 1 Application(s) Topical <User Schedule>  metoprolol tartrate 25 milliGRAM(s) Oral every 12 hours  ondansetron Injectable 4 milliGRAM(s) IV Push once  pantoprazole    Tablet 40 milliGRAM(s) Oral before breakfast    PRN MEDICATIONS    VITALS:   T(F): 96.1  HR: 58  BP: 120/57  RR: 44  SpO2: 99%    LABS:                        11.6   7.56  )-----------( 231      ( 14 Dec 2018 04:09 )             35.2     12-14    144  |  109  |  18  ----------------------------<  106<H>  4.5   |  21  |  1.1    Ca    9.1      14 Dec 2018 04:09  Mg     2.0     12-14    TPro  6.0  /  Alb  3.5  /  TBili  0.4  /  DBili  x   /  AST  17  /  ALT  12  /  AlkPhos  88  12-14    PT/INR - ( 14 Dec 2018 04:09 )   PT: 11.90 sec;   INR: 1.04 ratio         PTT - ( 14 Dec 2018 04:09 )  PTT:31.5 sec          CARDIAC MARKERS ( 13 Dec 2018 10:12 )  x     / <0.01 ng/mL / 113 U/L / x     / 2.2 ng/mL      RADIOLOGY:    PHYSICAL EXAM:  GEN: No acute distress  LUNGS: Clear to auscultation bilaterally   HEART: S1/S2 present. RRR.   ABD: Soft, non-tender, non-distended. Bowel sounds present  EXT: No edema  NEURO: AAOX3      PHYSICAL EXAM:  GEN: No acute distress  CHEST: Clear to auscultation bilaterally   CVS: S1/S2 present. RRR.   ABD: Soft, non-tender, non-distended. Bowel sounds present  EXT: No edema  NEURO: AAOX3    A/P    79 yo F with PMH of HTN present with substernal chest pressure relieved with rest, for the past 2 and half weeks a/w exertional dyspnea, found to have T wave bipahsic inversions anteroseptal leads. Patient was evaluated by cardio team in ED and recommends to start on heparin drip and CCU monitoring.       # Ischemic Cardiomyopathy- severe 3V Disease  - Patient is currently asymptomatic and hemodynamically stable.   - CE: negative.   - BNP 8394  - EKG: T wave bipahsic inversions anteroseptal leads. Wellens' pattern on ECG -->Possible Proximal LAD stenosis.  - Cardio-  Severe 3vcad with total rca and LAD without any good collaterals to LAD nor any substantial antegrade flow. The LCX is the only patent vessels with 70% tandem lesion. If no viability and is not a candidate for surgery will do high risk PCI early next week on LCX.   - c/w ASA, Plavix and statin  - c/w lopressor 25mg BID   - f/u Lipid panel and HbA1c  - If Chest pain recurs --> get STAT EKG, CE and notify Cardio team.    - will monitor in CCU.  - Awaiting cardiac MRI and chect non con CT  - Appreciate CT rec's    # HTN  - c/w metoprolol    #UTI  - c/w ceftriaxone (day 4)       DVT ppx: on heparin drip  GI ppx: PPI   Activity: Bed rest for now  Diet: DASH diet     Son's number: Majo:  24H events:    Patient is a 78y old Female who presents with a chief complaint of Chest pain (13 Dec 2018 18:51)    Primary diagnosis of Chest pain     Today is hospital day 3d.     She denies chest pain, sob, palpitations, headache, dizziness.    PAST MEDICAL & SURGICAL HISTORY  Hypertension  No significant past surgical history    ALLERGIES:  No Known Allergies    MEDICATIONS:  STANDING MEDICATIONS  aspirin  chewable 81 milliGRAM(s) Oral daily  atorvastatin 80 milliGRAM(s) Oral at bedtime  cefTRIAXone   IVPB 1 Gram(s) IV Intermittent every 24 hours  chlorhexidine 4% Liquid 1 Application(s) Topical <User Schedule>  metoprolol tartrate 25 milliGRAM(s) Oral every 12 hours  ondansetron Injectable 4 milliGRAM(s) IV Push once  pantoprazole    Tablet 40 milliGRAM(s) Oral before breakfast    PRN MEDICATIONS    VITALS:   T(F): 96.1  HR: 58  BP: 120/57  RR: 20  SpO2: 99%    LABS:                        11.6   7.56  )-----------( 231      ( 14 Dec 2018 04:09 )             35.2     12-14    144  |  109  |  18  ----------------------------<  106<H>  4.5   |  21  |  1.1    Ca    9.1      14 Dec 2018 04:09  Mg     2.0     12-14    TPro  6.0  /  Alb  3.5  /  TBili  0.4  /  DBili  x   /  AST  17  /  ALT  12  /  AlkPhos  88  12-14    PT/INR - ( 14 Dec 2018 04:09 )   PT: 11.90 sec;   INR: 1.04 ratio         PTT - ( 14 Dec 2018 04:09 )  PTT:31.5 sec          CARDIAC MARKERS ( 13 Dec 2018 10:12 )  x     / <0.01 ng/mL / 113 U/L / x     / 2.2 ng/mL      PHYSICAL EXAM:  GEN: No acute distress  CHEST: Clear to auscultation bilaterally   CVS: S1/S2 present. RRR.   ABD: Soft, non-tender, non-distended. Bowel sounds present  EXT: No edema  NEURO: AAOX3    A/P    79 yo F with PMH of HTN present with substernal chest pressure relieved with rest, for the past 2 and half weeks a/w exertional dyspnea, found to have T wave bipahsic inversions anteroseptal leads. Patient was evaluated by cardio team in ED and recommends to start on heparin drip and CCU monitoring.       # Ischemic Cardiomyopathy- severe 3V Disease  - Patient is currently asymptomatic and hemodynamically stable.   - CE: negative.   - BNP 8394  - EKG: T wave bipahsic inversions anteroseptal leads. Wellens' pattern on ECG -->Possible Proximal LAD stenosis.  - Cardio-  Severe 3vcad with total rca and LAD without any good collaterals to LAD nor any substantial antegrade flow. The LCX is the only patent vessels with 70% tandem lesion. Cardiac MRI reveals Non viable anterior wall. add lisinopril 2.5 mg daily. Will review with CT surgery. Will evlauate for CHIPS case and interventin of LCX with impella support.   - c/w ASA, Plavix and statin  - c/w lopressor 25mg BID   - start lisinopril  - If Chest pain recurs --> get STAT EKG, CE and notify Cardio team.    - will monitor in CCU.  - Appreciate CT rec's    # HTN  - c/w metoprolol    #UTI  - c/w ceftriaxone (day 4)       DVT ppx: on heparin subcut  GI ppx: PPI   Activity: Bed rest for now  Diet: DASH diet     Son's number: Majo:

## 2018-12-15 LAB
ANION GAP SERPL CALC-SCNC: 14 MMOL/L — SIGNIFICANT CHANGE UP (ref 7–14)
BUN SERPL-MCNC: 16 MG/DL — SIGNIFICANT CHANGE UP (ref 10–20)
CALCIUM SERPL-MCNC: 9.7 MG/DL — SIGNIFICANT CHANGE UP (ref 8.5–10.1)
CHLORIDE SERPL-SCNC: 108 MMOL/L — SIGNIFICANT CHANGE UP (ref 98–110)
CO2 SERPL-SCNC: 22 MMOL/L — SIGNIFICANT CHANGE UP (ref 17–32)
CREAT SERPL-MCNC: 1.1 MG/DL — SIGNIFICANT CHANGE UP (ref 0.7–1.5)
CRP SERPL-MCNC: <0.1 MG/DL — SIGNIFICANT CHANGE UP (ref 0–0.4)
GLUCOSE SERPL-MCNC: 109 MG/DL — HIGH (ref 70–99)
HCT VFR BLD CALC: 36.9 % — LOW (ref 37–47)
HGB BLD-MCNC: 12.1 G/DL — SIGNIFICANT CHANGE UP (ref 12–16)
MAGNESIUM SERPL-MCNC: 2 MG/DL — SIGNIFICANT CHANGE UP (ref 1.8–2.4)
MCHC RBC-ENTMCNC: 28.3 PG — SIGNIFICANT CHANGE UP (ref 27–31)
MCHC RBC-ENTMCNC: 32.8 G/DL — SIGNIFICANT CHANGE UP (ref 32–37)
MCV RBC AUTO: 86.2 FL — SIGNIFICANT CHANGE UP (ref 81–99)
MRSA PCR RESULT.: POSITIVE
NRBC # BLD: 0 /100 WBCS — SIGNIFICANT CHANGE UP (ref 0–0)
PLATELET # BLD AUTO: 232 K/UL — SIGNIFICANT CHANGE UP (ref 130–400)
POTASSIUM SERPL-MCNC: 4.3 MMOL/L — SIGNIFICANT CHANGE UP (ref 3.5–5)
POTASSIUM SERPL-SCNC: 4.3 MMOL/L — SIGNIFICANT CHANGE UP (ref 3.5–5)
RBC # BLD: 4.28 M/UL — SIGNIFICANT CHANGE UP (ref 4.2–5.4)
RBC # FLD: 13.4 % — SIGNIFICANT CHANGE UP (ref 11.5–14.5)
SODIUM SERPL-SCNC: 144 MMOL/L — SIGNIFICANT CHANGE UP (ref 135–146)
WBC # BLD: 7.44 K/UL — SIGNIFICANT CHANGE UP (ref 4.8–10.8)
WBC # FLD AUTO: 7.44 K/UL — SIGNIFICANT CHANGE UP (ref 4.8–10.8)

## 2018-12-15 RX ADMIN — PANTOPRAZOLE SODIUM 40 MILLIGRAM(S): 20 TABLET, DELAYED RELEASE ORAL at 06:26

## 2018-12-15 RX ADMIN — HEPARIN SODIUM 5000 UNIT(S): 5000 INJECTION INTRAVENOUS; SUBCUTANEOUS at 06:26

## 2018-12-15 RX ADMIN — HEPARIN SODIUM 5000 UNIT(S): 5000 INJECTION INTRAVENOUS; SUBCUTANEOUS at 22:23

## 2018-12-15 RX ADMIN — CEFTRIAXONE 100 GRAM(S): 500 INJECTION, POWDER, FOR SOLUTION INTRAMUSCULAR; INTRAVENOUS at 15:25

## 2018-12-15 RX ADMIN — HEPARIN SODIUM 5000 UNIT(S): 5000 INJECTION INTRAVENOUS; SUBCUTANEOUS at 14:30

## 2018-12-15 RX ADMIN — ATORVASTATIN CALCIUM 80 MILLIGRAM(S): 80 TABLET, FILM COATED ORAL at 22:23

## 2018-12-15 RX ADMIN — Medication 81 MILLIGRAM(S): at 12:06

## 2018-12-15 NOTE — PROGRESS NOTE ADULT - ASSESSMENT
79 yo F with PMH of HTN present with 2 week history of substernal chest pressure relieved with rest and exertional dyspnea, found to have T wave biphasic inversions in the anteroseptal leads.     # Ischemic Cardiomyopathy with Severe 3 Vessel Disease  -Asymptomatic, hemodynamically stable  -Troponin negative x4, BNP 8394  -Cardiac MRI: nonviable apical walls/mid cavity anterior/anteroseptal walls/anterolateral walls, LV EF 26%   -Cardiology on board   -CTS: will determine if patient is candidate for surgical revascularization   -ASA, Statin, ACEI, BB. Plavix on hold ??     #HTN  -Lisinopril 2.5mg daily     #UTI  -Ceftriaxone 1g q24h to end 12/16/18 (5 day course)     DVT ppx: Heparin 5000 units sub-q q8h   GI ppx: PPI   Activity: Bedrest   Diet: DASH/TLC  FULL CODE     Son: Rogerio

## 2018-12-15 NOTE — PROGRESS NOTE ADULT - SUBJECTIVE AND OBJECTIVE BOX
SUBJECTIVE:    Patient is a 78y old Female who presents with a chief complaint of Chest pain (14 Dec 2018 19:19)    Currently admitted to medicine with the primary diagnosis of ischemic cardiomyopathy      Today is hospital day 4d. No acute overnight events. Patient denies headache, chest pain, palpitations, dyspnea, abdominal pain, N/V/D/C. CTS to decide on CABG.     PAST MEDICAL & SURGICAL HISTORY  Hypertension  No significant past surgical history    SOCIAL HISTORY:  Patient denies alcohol, tobacco, and recreational drug use.     From (X ) home ( ) nursing home ( ) other   Ambulation status: (X ) ambulates independently ( ) ambulates with assistance ( ) ambulates with device ( ) dependent   Device: ( ) rolling walker ( ) cane     ALLERGIES:  No Known Allergies    MEDICATIONS:  STANDING MEDICATIONS  aspirin  chewable 81 milliGRAM(s) Oral daily  atorvastatin 80 milliGRAM(s) Oral at bedtime  cefTRIAXone   IVPB 1 Gram(s) IV Intermittent every 24 hours  chlorhexidine 4% Liquid 1 Application(s) Topical <User Schedule>  heparin  Injectable 5000 Unit(s) SubCutaneous every 8 hours  lisinopril 2.5 milliGRAM(s) Oral daily  metoprolol tartrate 25 milliGRAM(s) Oral every 12 hours  ondansetron Injectable 4 milliGRAM(s) IV Push once  pantoprazole    Tablet 40 milliGRAM(s) Oral before breakfast    PRN MEDICATIONS    VITALS:   T(F): 96.8  HR: 58  BP: 106/54  RR: 24  SpO2: 99%    LABS:             11.6   7.56  )-----------( 231      ( 14 Dec 2018 04:09 )             35.2     12-14    144  |  109  |  18  ----------------------------<  106<H>  4.5   |  21  |  1.1    Ca    9.1      14 Dec 2018 04:09  Mg     2.0     12-14    TPro  6.0  /  Alb  3.5  /  TBili  0.4  /  DBili  x   /  AST  17  /  ALT  12  /  AlkPhos  88  12-14    PT/INR - ( 14 Dec 2018 04:09 )   PT: 11.90 sec;   INR: 1.04 ratio      PTT - ( 14 Dec 2018 04:09 )  PTT:31.5 sec    Sedimentation Rate, Erythrocyte: 3 mm/hr (12-14-18 @ 10:51)    CARDIAC MARKERS ( 13 Dec 2018 10:12 )  x     / <0.01 ng/mL / 113 U/L / x     / 2.2 ng/mL    RADIOLOGY:    Carotid Duplex  Impression:    20-39% stenosis of the right internal carotid artery.    20-39% stenosis of the left internal carotid artery.    Cardiac MRI    IMPRESSION:  1.  Nonviable apical walls, mid cavity anterior, anteroseptal and   anterolateral walls.  2.  Left ventricle ejection fraction is 26%.  3.  Normal right ventricular wall motion and ejection fraction.    CT Chest  IMPRESSION:    No CT evidence of acute intrathoracic abnormality.    PHYSICAL EXAM:  GEN: No acute distress, lying comfortably in bed   LUNGS: Clear to auscultation bilaterally, no labored breathing   HEART: S1/S2 present. RRR.   ABD: Soft, non-tender, non-distended. Bowel sounds present.   EXT: Noncyanotic, nonedematous, 2+ peripheral pulses, skin intact   NEURO: AAOX3, CN II-XII grossly intact     Lines/Tubes   Peripheral IV access SUBJECTIVE:    Patient is a 78y old Female who presents with a chief complaint of Chest pain (14 Dec 2018 19:19)    Currently admitted to medicine with the primary diagnosis of ischemic cardiomyopathy      Today is hospital day 4d. No acute overnight events. Patient denies headache, chest pain, palpitations, dyspnea, abdominal pain, N/V/D/C. CTS to decide on CABG.   no cp, angel, orthopnea, pnd    PAST MEDICAL & SURGICAL HISTORY  Hypertension  No significant past surgical history    SOCIAL HISTORY:  Patient denies alcohol, tobacco, and recreational drug use.     From (X ) home ( ) nursing home ( ) other   Ambulation status: (X ) ambulates independently ( ) ambulates with assistance ( ) ambulates with device ( ) dependent   Device: ( ) rolling walker ( ) cane     ALLERGIES:  No Known Allergies    MEDICATIONS:  STANDING MEDICATIONS  aspirin  chewable 81 milliGRAM(s) Oral daily  atorvastatin 80 milliGRAM(s) Oral at bedtime  cefTRIAXone   IVPB 1 Gram(s) IV Intermittent every 24 hours  chlorhexidine 4% Liquid 1 Application(s) Topical <User Schedule>  heparin  Injectable 5000 Unit(s) SubCutaneous every 8 hours  lisinopril 2.5 milliGRAM(s) Oral daily  metoprolol tartrate 25 milliGRAM(s) Oral every 12 hours  ondansetron Injectable 4 milliGRAM(s) IV Push once  pantoprazole    Tablet 40 milliGRAM(s) Oral before breakfast    PRN MEDICATIONS    VITALS:   T(F): 96.8  HR: 58  BP: 106/54  RR: 24  SpO2: 99%    LABS:             11.6   7.56  )-----------( 231      ( 14 Dec 2018 04:09 )             35.2     12-14    144  |  109  |  18  ----------------------------<  106<H>  4.5   |  21  |  1.1    Ca    9.1      14 Dec 2018 04:09  Mg     2.0     12-14    TPro  6.0  /  Alb  3.5  /  TBili  0.4  /  DBili  x   /  AST  17  /  ALT  12  /  AlkPhos  88  12-14    PT/INR - ( 14 Dec 2018 04:09 )   PT: 11.90 sec;   INR: 1.04 ratio      PTT - ( 14 Dec 2018 04:09 )  PTT:31.5 sec    Sedimentation Rate, Erythrocyte: 3 mm/hr (12-14-18 @ 10:51)    CARDIAC MARKERS ( 13 Dec 2018 10:12 )  x     / <0.01 ng/mL / 113 U/L / x     / 2.2 ng/mL    RADIOLOGY:    Carotid Duplex  Impression:    20-39% stenosis of the right internal carotid artery.    20-39% stenosis of the left internal carotid artery.    Cardiac MRI    IMPRESSION:  1.  Nonviable apical walls, mid cavity anterior, anteroseptal and   anterolateral walls.  2.  Left ventricle ejection fraction is 26%.  3.  Normal right ventricular wall motion and ejection fraction.    CT Chest  IMPRESSION:    No CT evidence of acute intrathoracic abnormality.    PHYSICAL EXAM:  GEN: No acute distress, lying comfortably in bed   LUNGS: Clear to auscultation bilaterally, no labored breathing   HEART: S1/S2 present. RRR.   ABD: Soft, non-tender, non-distended. Bowel sounds present.   EXT: Noncyanotic, nonedematous, 2+ peripheral pulses, skin intact   NEURO: AAOX3, CN II-XII grossly intact     Lines/Tubes   Peripheral IV access

## 2018-12-15 NOTE — PROGRESS NOTE ADULT - ATTENDING COMMENTS
to f/u CT surgery for possible cabg  cont current management per cards  asa, lipitor 80  lisinopril 2.5  lopressor 25 bid  d/c ceftriaxone after 5 day course

## 2018-12-15 NOTE — PROGRESS NOTE ADULT - ATTENDING COMMENTS
77 yo F with h/o second hand smoker, HTN, presented with substernal chest pressure initially relieved with rest, for the past 2 and half weeks a/w BOGGS progressing to symptoms at rest. Patient's w/u revealed severe TVD, congestive heart failure (systolic, with EF of 30-35%).   CT surgery was asked to evaluate patient for surgical revascularization  Myocardial viability was assessed with cardiac MRI  There is no viability in LAD territory as per Dr. Bond   Case d/w Dr. Posada. In the absence of viability in LAD distribution, there is not usual benefit of surgical revascularization as pt's LV probably is not going to improve.    will proceed with planning of percutaneous intervention. Will present the case in cath conference.        Cards: Dr. Posada  EP: Dr. Christophe Zabala  PMD: Patric Haynes MD    Preop w/u  LHC:   Left main mild distal disease  LAD:  Proximal: 100% stenosis without late distal filling.                    Diag: supplied by collaterals from LCx  Left Circumflex:  Proximal : moderate disease  OM1:  two sequential 70% lesions  Right Coronary Artery: Proximal RCA: 100% stenosis, distal RCA supplied by collaterals from LCx    TTE: < from: Transthoracic Echocardiogram (12.12.18 @ 10:51) >  EF 30-35%, multiple LV regional WMA, mild MR, TR

## 2018-12-16 LAB
ANION GAP SERPL CALC-SCNC: 17 MMOL/L — HIGH (ref 7–14)
BASOPHILS # BLD AUTO: 0.03 K/UL — SIGNIFICANT CHANGE UP (ref 0–0.2)
BASOPHILS NFR BLD AUTO: 0.4 % — SIGNIFICANT CHANGE UP (ref 0–1)
BUN SERPL-MCNC: 20 MG/DL — SIGNIFICANT CHANGE UP (ref 10–20)
CALCIUM SERPL-MCNC: 9.2 MG/DL — SIGNIFICANT CHANGE UP (ref 8.5–10.1)
CHLORIDE SERPL-SCNC: 106 MMOL/L — SIGNIFICANT CHANGE UP (ref 98–110)
CO2 SERPL-SCNC: 20 MMOL/L — SIGNIFICANT CHANGE UP (ref 17–32)
CREAT SERPL-MCNC: 1.2 MG/DL — SIGNIFICANT CHANGE UP (ref 0.7–1.5)
EOSINOPHIL # BLD AUTO: 0.23 K/UL — SIGNIFICANT CHANGE UP (ref 0–0.7)
EOSINOPHIL NFR BLD AUTO: 3.2 % — SIGNIFICANT CHANGE UP (ref 0–8)
GLUCOSE SERPL-MCNC: 112 MG/DL — HIGH (ref 70–99)
HCT VFR BLD CALC: 38.6 % — SIGNIFICANT CHANGE UP (ref 37–47)
HGB BLD-MCNC: 12.7 G/DL — SIGNIFICANT CHANGE UP (ref 12–16)
IMM GRANULOCYTES NFR BLD AUTO: 0.3 % — SIGNIFICANT CHANGE UP (ref 0.1–0.3)
LYMPHOCYTES # BLD AUTO: 2.02 K/UL — SIGNIFICANT CHANGE UP (ref 1.2–3.4)
LYMPHOCYTES # BLD AUTO: 27.8 % — SIGNIFICANT CHANGE UP (ref 20.5–51.1)
MAGNESIUM SERPL-MCNC: 2 MG/DL — SIGNIFICANT CHANGE UP (ref 1.8–2.4)
MCHC RBC-ENTMCNC: 28.6 PG — SIGNIFICANT CHANGE UP (ref 27–31)
MCHC RBC-ENTMCNC: 32.9 G/DL — SIGNIFICANT CHANGE UP (ref 32–37)
MCV RBC AUTO: 86.9 FL — SIGNIFICANT CHANGE UP (ref 81–99)
MONOCYTES # BLD AUTO: 0.52 K/UL — SIGNIFICANT CHANGE UP (ref 0.1–0.6)
MONOCYTES NFR BLD AUTO: 7.2 % — SIGNIFICANT CHANGE UP (ref 1.7–9.3)
NEUTROPHILS # BLD AUTO: 4.45 K/UL — SIGNIFICANT CHANGE UP (ref 1.4–6.5)
NEUTROPHILS NFR BLD AUTO: 61.1 % — SIGNIFICANT CHANGE UP (ref 42.2–75.2)
NRBC # BLD: 0 /100 WBCS — SIGNIFICANT CHANGE UP (ref 0–0)
PLATELET # BLD AUTO: 246 K/UL — SIGNIFICANT CHANGE UP (ref 130–400)
POTASSIUM SERPL-MCNC: 4.4 MMOL/L — SIGNIFICANT CHANGE UP (ref 3.5–5)
POTASSIUM SERPL-SCNC: 4.4 MMOL/L — SIGNIFICANT CHANGE UP (ref 3.5–5)
RBC # BLD: 4.44 M/UL — SIGNIFICANT CHANGE UP (ref 4.2–5.4)
RBC # FLD: 13.5 % — SIGNIFICANT CHANGE UP (ref 11.5–14.5)
SODIUM SERPL-SCNC: 143 MMOL/L — SIGNIFICANT CHANGE UP (ref 135–146)
WBC # BLD: 7.27 K/UL — SIGNIFICANT CHANGE UP (ref 4.8–10.8)
WBC # FLD AUTO: 7.27 K/UL — SIGNIFICANT CHANGE UP (ref 4.8–10.8)

## 2018-12-16 RX ADMIN — Medication 25 MILLIGRAM(S): at 21:06

## 2018-12-16 RX ADMIN — HEPARIN SODIUM 5000 UNIT(S): 5000 INJECTION INTRAVENOUS; SUBCUTANEOUS at 13:29

## 2018-12-16 RX ADMIN — ATORVASTATIN CALCIUM 80 MILLIGRAM(S): 80 TABLET, FILM COATED ORAL at 21:06

## 2018-12-16 RX ADMIN — LISINOPRIL 2.5 MILLIGRAM(S): 2.5 TABLET ORAL at 06:18

## 2018-12-16 RX ADMIN — Medication 81 MILLIGRAM(S): at 11:20

## 2018-12-16 RX ADMIN — HEPARIN SODIUM 5000 UNIT(S): 5000 INJECTION INTRAVENOUS; SUBCUTANEOUS at 21:06

## 2018-12-16 RX ADMIN — PANTOPRAZOLE SODIUM 40 MILLIGRAM(S): 20 TABLET, DELAYED RELEASE ORAL at 06:18

## 2018-12-16 RX ADMIN — HEPARIN SODIUM 5000 UNIT(S): 5000 INJECTION INTRAVENOUS; SUBCUTANEOUS at 06:18

## 2018-12-16 RX ADMIN — Medication 25 MILLIGRAM(S): at 06:18

## 2018-12-16 NOTE — PROGRESS NOTE ADULT - SUBJECTIVE AND OBJECTIVE BOX
24H events:    Patient is a 78y old Female who presents with a chief complaint of Chest pain (15 Dec 2018 17:19)    Primary diagnosis of Chest pain     Today is hospital day 5d. No acute events over night. Awaiting cardiology rec's for PCI.      PAST MEDICAL & SURGICAL HISTORY  Hypertension  No significant past surgical history    SOCIAL HISTORY:  Negative for smoking/alcohol/drug use.     ALLERGIES:  No Known Allergies    MEDICATIONS:  STANDING MEDICATIONS  aspirin  chewable 81 milliGRAM(s) Oral daily  atorvastatin 80 milliGRAM(s) Oral at bedtime  chlorhexidine 4% Liquid 1 Application(s) Topical <User Schedule>  heparin  Injectable 5000 Unit(s) SubCutaneous every 8 hours  lisinopril 2.5 milliGRAM(s) Oral daily  metoprolol tartrate 25 milliGRAM(s) Oral every 12 hours  ondansetron Injectable 4 milliGRAM(s) IV Push once  pantoprazole    Tablet 40 milliGRAM(s) Oral before breakfast    PRN MEDICATIONS    VITALS:   T(F): 98.1  HR: 64  BP: 115/59  RR: 17  SpO2: 99%    LABS:                        12.1   7.44  )-----------( 232      ( 15 Dec 2018 04:25 )             36.9     12-15    144  |  108  |  16  ----------------------------<  109<H>  4.3   |  22  |  1.1    Ca    9.7      15 Dec 2018 04:25  Mg     2.0     12-15    TPro  6.0  /  Alb  3.5  /  TBili  0.4  /  DBili  x   /  AST  17  /  ALT  12  /  AlkPhos  88  12-14    PT/INR - ( 14 Dec 2018 04:09 )   PT: 11.90 sec;   INR: 1.04 ratio         PTT - ( 14 Dec 2018 04:09 )  PTT:31.5 sec              RADIOLOGY:    PHYSICAL EXAM:  GEN: No acute distress  LUNGS: Clear to auscultation bilaterally   HEART: S1/S2 present. RRR.   ABD: Soft, non-tender, non-distended. Bowel sounds present  EXT: No edema  NEURO: AAOX3    A/P    79 yo F with PMH of HTN present with 2 week history of substernal chest pressure relieved with rest and exertional dyspnea, found to have T wave biphasic inversions in the anteroseptal leads.     # Ischemic Cardiomyopathy with Severe 3 Vessel Disease  -Asymptomatic, hemodynamically stable  -Troponin negative x4, BNP 8394  -Cardiac MRI: nonviable apical walls/mid cavity anterior/anteroseptal walls/anterolateral walls, LV EF 26%   -Cardiology on board   -CTS: not a surgical candidate due to nonviable LAD territory  -Awaiting cards rec's for PCI    -ASA, Statin, ACEI, BB. Plavix on hold ??     #HTN  -Lisinopril 2.5mg daily     #UTI  -Ceftriaxone 1g q24h to end 12/16/18 (5 day course)     DVT ppx: Heparin 5000 units sub-q q8h   GI ppx: PPI   Activity: Bedrest   Diet: DASH/TLC  FULL CODE     Son: Rogerio  24H events:    Patient is a 78y old Female who presents with a chief complaint of Chest pain (15 Dec 2018 17:19)    Primary diagnosis of Chest pain     Today is hospital day 5d. No acute events over night. Awaiting cardiology rec's for PCI.    no cp, sob, abd pain, fever  no cp, angel, palpitations, orthopnea    PAST MEDICAL & SURGICAL HISTORY  Hypertension  No significant past surgical history    SOCIAL HISTORY:  Negative for smoking/alcohol/drug use.     ALLERGIES:  No Known Allergies    MEDICATIONS:  STANDING MEDICATIONS  aspirin  chewable 81 milliGRAM(s) Oral daily  atorvastatin 80 milliGRAM(s) Oral at bedtime  chlorhexidine 4% Liquid 1 Application(s) Topical <User Schedule>  heparin  Injectable 5000 Unit(s) SubCutaneous every 8 hours  lisinopril 2.5 milliGRAM(s) Oral daily  metoprolol tartrate 25 milliGRAM(s) Oral every 12 hours  ondansetron Injectable 4 milliGRAM(s) IV Push once  pantoprazole    Tablet 40 milliGRAM(s) Oral before breakfast    PRN MEDICATIONS    VITALS:   T(F): 98.1  HR: 64  BP: 115/59  RR: 17  SpO2: 99%    LABS:                        12.1   7.44  )-----------( 232      ( 15 Dec 2018 04:25 )             36.9     12-15    144  |  108  |  16  ----------------------------<  109<H>  4.3   |  22  |  1.1    Ca    9.7      15 Dec 2018 04:25  Mg     2.0     12-15    TPro  6.0  /  Alb  3.5  /  TBili  0.4  /  DBili  x   /  AST  17  /  ALT  12  /  AlkPhos  88  12-14    PT/INR - ( 14 Dec 2018 04:09 )   PT: 11.90 sec;   INR: 1.04 ratio         PTT - ( 14 Dec 2018 04:09 )  PTT:31.5 sec              RADIOLOGY:    PHYSICAL EXAM:  GEN: No acute distress  LUNGS: Clear to auscultation bilaterally   HEART: S1/S2 present. RRR.   ABD: Soft, non-tender, non-distended. Bowel sounds present  EXT: No edema  NEURO: AAOX3    A/P    77 yo F with PMH of HTN present with 2 week history of substernal chest pressure relieved with rest and exertional dyspnea, found to have T wave biphasic inversions in the anteroseptal leads.     # Ischemic Cardiomyopathy with Severe 3 Vessel Disease  -Asymptomatic, hemodynamically stable  -Troponin negative x4, BNP 8394  -Cardiac MRI: nonviable apical walls/mid cavity anterior/anteroseptal walls/anterolateral walls, LV EF 26%   -Cardiology on board   -CTS: not a surgical candidate due to nonviable LAD territory  -Awaiting cards rec's for PCI    -ASA, Statin, ACEI, BB. Plavix on hold ??     #acute heart failure systolic  management as above    #HTN  -Lisinopril 2.5mg daily     #UTI  -Ceftriaxone 1g q24h to end 12/16/18 (5 day course)     DVT ppx: Heparin 5000 units sub-q q8h   GI ppx: PPI   Activity: Bedrest   Diet: DASH/TLC  FULL CODE     Son: Rogerio

## 2018-12-17 LAB
ANION GAP SERPL CALC-SCNC: 15 MMOL/L — HIGH (ref 7–14)
BUN SERPL-MCNC: 23 MG/DL — HIGH (ref 10–20)
C-ANCA SER-ACNC: NEGATIVE — SIGNIFICANT CHANGE UP
CALCIUM SERPL-MCNC: 9.2 MG/DL — SIGNIFICANT CHANGE UP (ref 8.5–10.1)
CHLORIDE SERPL-SCNC: 108 MMOL/L — SIGNIFICANT CHANGE UP (ref 98–110)
CO2 SERPL-SCNC: 20 MMOL/L — SIGNIFICANT CHANGE UP (ref 17–32)
CREAT SERPL-MCNC: 1 MG/DL — SIGNIFICANT CHANGE UP (ref 0.7–1.5)
GLUCOSE SERPL-MCNC: 112 MG/DL — HIGH (ref 70–99)
HCT VFR BLD CALC: 35.5 % — LOW (ref 37–47)
HGB BLD-MCNC: 11.5 G/DL — LOW (ref 12–16)
MAGNESIUM SERPL-MCNC: 1.9 MG/DL — SIGNIFICANT CHANGE UP (ref 1.8–2.4)
MCHC RBC-ENTMCNC: 28.3 PG — SIGNIFICANT CHANGE UP (ref 27–31)
MCHC RBC-ENTMCNC: 32.4 G/DL — SIGNIFICANT CHANGE UP (ref 32–37)
MCV RBC AUTO: 87.2 FL — SIGNIFICANT CHANGE UP (ref 81–99)
NRBC # BLD: 0 /100 WBCS — SIGNIFICANT CHANGE UP (ref 0–0)
P-ANCA SER-ACNC: NEGATIVE — SIGNIFICANT CHANGE UP
PLATELET # BLD AUTO: 226 K/UL — SIGNIFICANT CHANGE UP (ref 130–400)
POTASSIUM SERPL-MCNC: 4.1 MMOL/L — SIGNIFICANT CHANGE UP (ref 3.5–5)
POTASSIUM SERPL-SCNC: 4.1 MMOL/L — SIGNIFICANT CHANGE UP (ref 3.5–5)
RBC # BLD: 4.07 M/UL — LOW (ref 4.2–5.4)
RBC # FLD: 13.7 % — SIGNIFICANT CHANGE UP (ref 11.5–14.5)
SODIUM SERPL-SCNC: 143 MMOL/L — SIGNIFICANT CHANGE UP (ref 135–146)
WBC # BLD: 7.82 K/UL — SIGNIFICANT CHANGE UP (ref 4.8–10.8)
WBC # FLD AUTO: 7.82 K/UL — SIGNIFICANT CHANGE UP (ref 4.8–10.8)

## 2018-12-17 RX ORDER — MUPIROCIN 20 MG/G
1 OINTMENT TOPICAL
Qty: 0 | Refills: 0 | Status: DISCONTINUED | OUTPATIENT
Start: 2018-12-17 | End: 2018-12-18

## 2018-12-17 RX ORDER — LANOLIN ALCOHOL/MO/W.PET/CERES
3 CREAM (GRAM) TOPICAL AT BEDTIME
Qty: 0 | Refills: 0 | Status: DISCONTINUED | OUTPATIENT
Start: 2018-12-17 | End: 2018-12-22

## 2018-12-17 RX ORDER — LOPERAMIDE HCL 2 MG
2 TABLET ORAL ONCE
Qty: 0 | Refills: 0 | Status: DISCONTINUED | OUTPATIENT
Start: 2018-12-17 | End: 2018-12-19

## 2018-12-17 RX ADMIN — Medication 25 MILLIGRAM(S): at 06:11

## 2018-12-17 RX ADMIN — ATORVASTATIN CALCIUM 80 MILLIGRAM(S): 80 TABLET, FILM COATED ORAL at 21:56

## 2018-12-17 RX ADMIN — PANTOPRAZOLE SODIUM 40 MILLIGRAM(S): 20 TABLET, DELAYED RELEASE ORAL at 06:11

## 2018-12-17 RX ADMIN — HEPARIN SODIUM 5000 UNIT(S): 5000 INJECTION INTRAVENOUS; SUBCUTANEOUS at 06:11

## 2018-12-17 RX ADMIN — HEPARIN SODIUM 5000 UNIT(S): 5000 INJECTION INTRAVENOUS; SUBCUTANEOUS at 21:56

## 2018-12-17 RX ADMIN — Medication 25 MILLIGRAM(S): at 20:54

## 2018-12-17 RX ADMIN — Medication 81 MILLIGRAM(S): at 12:10

## 2018-12-17 RX ADMIN — Medication 3 MILLIGRAM(S): at 21:56

## 2018-12-17 RX ADMIN — HEPARIN SODIUM 5000 UNIT(S): 5000 INJECTION INTRAVENOUS; SUBCUTANEOUS at 14:30

## 2018-12-17 NOTE — PROGRESS NOTE ADULT - ATTENDING COMMENTS
79 yo F with h/o second hand smoker, HTN, presented with substernal chest pressure initially relieved with rest, for the past 2 and half weeks a/w BOGGS progressing to symptoms at rest. Patient's w/u revealed severe TVD, congestive heart failure (systolic, with EF of 30-35%).   CT surgery was asked to evaluate patient for surgical revascularization  Myocardial viability was assessed with cardiac MRI  There is no viability in LAD territory as per Dr. Bond  case d/w noninvasive cardiology and anterolateral wall on TTE is hypokinetic/akinetic, yet thick, and therefore there is disagreement as to degree of scarring in LAD distribution.   From revascularization standpoint, surgery is clearly a better option for the patient. From PCI standpoint if patient were to refuse surgery, the plan would be to revascularize circumflex. RCA and LAD are .    Patient to decide on surgery, if pt decides not to have surgery, will proceed with high risk PCI on thursday as per Dr. Posada  Case d/w Dr. Posada, Dr. Conteh, patient and pt's son

## 2018-12-17 NOTE — PROGRESS NOTE ADULT - ASSESSMENT
77 yo F with PMH of HTN present with 2 week history of substernal chest pressure relieved with rest and exertional dyspnea, found to have T wave biphasic inversions in the anteroseptal leads.     #Ischemic cardiomyopathy with severe 3 vessel disease  - asymptomatic, hemodynamically stable  - troponin negative x4, BNP 8394  - echo 12/12: LVEF 30-35%  - cardiac MRI 12/17: nonviable apical walls/mid cavity anterior/anteroseptal walls/anterolateral walls, LV EF 26%   - per CTS: not a surgical candidate due to nonviable LAD territory  - f/u cardiac recs for PCI    - c/w ASA, statin, ACEI, BB  - plavix on hold for now     #HTN  - c/w lisinopril 2.5mg daily     #UTI  - s/p ceftriaxone (ended 12/16)    #DVT ppx  - c/w heparin 5000 units subQ Q8H     #GI ppx  - c/w protonix 40 mg PO daily    #Diet  - DASH/TLC    #Code:   - FULL CODE   - Son Rogerio      #Dispo  - c/w CCU 77 yo F with PMH of HTN present with 2 week history of substernal chest pressure relieved with rest and exertional dyspnea, found to have T wave biphasic inversions in the anteroseptal leads.     #Ischemic cardiomyopathy with severe 3 vessel disease  - asymptomatic, hemodynamically stable  - troponin negative x4, BNP 8394  - echo 12/12: LVEF 30-35%  - cardiac MRI 12/17: nonviable apical walls/mid cavity anterior/anteroseptal walls/anterolateral walls, LV EF 26%   - per CTS: not a surgical candidate due to nonviable LAD territory  - f/u cardiac recs for PCI    - c/w ASA, statin, ACEI, BB  - plavix on hold for now     #HTN  - c/w lisinopril, metoprolol    #Chronic diarrhea  - c/w loperamide prn    #UTI  - s/p ceftriaxone (ended 12/16)    #DVT ppx  - c/w heparin 5000 units subQ Q8H     #GI ppx  - c/w protonix 40 mg PO daily    #Diet  - DASH/TLC    #Code:   - FULL CODE   - Son Rogerio      #Dispo  - c/w CCU

## 2018-12-17 NOTE — PROGRESS NOTE ADULT - SUBJECTIVE AND OBJECTIVE BOX
LENGTH OF HOSPITAL STAY: 6d    CHIEF COMPLAINT:   Patient is a 77 yo F who presents with a chief complaint of chest pain.    No acute events overnight. Has chronic loose stools x 3 mo, takes loperamide at home. Had loose stools overnight and mild abd irritation as well. No cp, sob.     HISTORY OF PRESENTING ILLNESS:   77 yo F with PMH of HTN present with chest pain. Patient complaints of substernal chest pressure relieved with rest, for the past 2 and half weeks a/w extertional dyspnea. Patient never had similar symptoms in the past. As per patient, her were getting worse over the past few days to the point that she started getting chest pains even with minimal exertion and sometimes even at rest. So, patient went to her PMD where ECG and blood work were done (cardiac blood work; details unknown)  and was told it was abnormal. Patient was sent by her to the ED. Patient denied any other symptoms. In ED, patient was found to have T wave bipahsic inversions anteroseptal leads. Currently, Patient is asymptomatic at rest.    PAST MEDICAL & SURGICAL HISTORY  PAST MEDICAL & SURGICAL HISTORY:  Hypertension  No significant past surgical history    SOCIAL HISTORY:    ALLERGIES:  No Known Allergies    MEDICATIONS:  STANDING MEDICATIONS  aspirin  chewable 81 milliGRAM(s) Oral daily  atorvastatin 80 milliGRAM(s) Oral at bedtime  chlorhexidine 4% Liquid 1 Application(s) Topical <User Schedule>  heparin  Injectable 5000 Unit(s) SubCutaneous every 8 hours  lisinopril 2.5 milliGRAM(s) Oral daily  loperamide 2 milliGRAM(s) Oral once  metoprolol tartrate 25 milliGRAM(s) Oral every 12 hours  ondansetron Injectable 4 milliGRAM(s) IV Push once  pantoprazole    Tablet 40 milliGRAM(s) Oral before breakfast    PRN MEDICATIONS    VITALS:   T(F): 95.5  HR: 54  BP: 99/61  RR: 29  SpO2: 98%    LABS:                        11.5   7.82  )-----------( 226      ( 17 Dec 2018 04:01 )             35.5     12-17    143  |  108  |  23<H>  ----------------------------<  112<H>  4.1   |  20  |  1.0    Ca    9.2      17 Dec 2018 04:01  Mg     1.9     12-17                    RADIOLOGY:    PHYSICAL EXAM:  GEN: no acute distress  HEENT: moist mucous membranes   LUNGS: clear to auscultation bilaterally   HEART: S1/S2 present, RRR  ABD: soft, nontender, nondistended, bowel sounds present  EXT: no edema  NEURO: AAOX3 LENGTH OF HOSPITAL STAY: 6d    CHIEF COMPLAINT:   Patient is a 79 yo F who presents with a chief complaint of chest pain.    No acute events overnight. Has chronic loose stools x 3 mo, takes loperamide at home. Had loose stools overnight and mild abd irritation as well. No cp, sob.     HISTORY OF PRESENTING ILLNESS:   79 yo F with PMH of HTN present with chest pain. Patient complaints of substernal chest pressure relieved with rest, for the past 2 and half weeks a/w extertional dyspnea. Patient never had similar symptoms in the past. As per patient, her were getting worse over the past few days to the point that she started getting chest pains even with minimal exertion and sometimes even at rest. So, patient went to her PMD where ECG and blood work were done (cardiac blood work; details unknown)  and was told it was abnormal. Patient was sent by her to the ED. Patient denied any other symptoms. In ED, patient was found to have T wave bipahsic inversions anteroseptal leads. Currently, Patient is asymptomatic at rest.    PAST MEDICAL & SURGICAL HISTORY  PAST MEDICAL & SURGICAL HISTORY:  Hypertension  No significant past surgical history    ALLERGIES:  No Known Allergies    MEDICATIONS:  STANDING MEDICATIONS  aspirin  chewable 81 milliGRAM(s) Oral daily  atorvastatin 80 milliGRAM(s) Oral at bedtime  chlorhexidine 4% Liquid 1 Application(s) Topical <User Schedule>  heparin  Injectable 5000 Unit(s) SubCutaneous every 8 hours  lisinopril 2.5 milliGRAM(s) Oral daily  loperamide 2 milliGRAM(s) Oral once  metoprolol tartrate 25 milliGRAM(s) Oral every 12 hours  ondansetron Injectable 4 milliGRAM(s) IV Push once  pantoprazole    Tablet 40 milliGRAM(s) Oral before breakfast    PRN MEDICATIONS    VITALS:   T(F): 95.5  HR: 54  BP: 99/61  RR: 29  SpO2: 98%    LABS:                        11.5   7.82  )-----------( 226      ( 17 Dec 2018 04:01 )             35.5     12-17    143  |  108  |  23<H>  ----------------------------<  112<H>  4.1   |  20  |  1.0    Ca    9.2      17 Dec 2018 04:01  Mg     1.9     12-17            PHYSICAL EXAM:  GEN: no acute distress  HEENT: moist mucous membranes   CHEST: clear to auscultation bilaterally   CVS: S1/S2 present, RRR  ABD: soft, nontender, nondistended, bowel sounds present  EXT: no edema  NEURO: AAOX3

## 2018-12-17 NOTE — PROGRESS NOTE ADULT - SUBJECTIVE AND OBJECTIVE BOX
PLANNED OPERATIVE PROCEDURE(s):                HD #                       SURGEON(s): MARKUS Salas    Consult requesting by: Dr. Posada   PMD: Dr. Patric Haynes     HISTORY OF PRESENT ILLNESS: 77 yo F exsmoker with PMH of HTN presented w/c/o substernal chest pressure initially relieved with rest, for the past 2 and half weeks a/w BOGGS progressing to symptoms at rest. In ED, patient was found to have T wave bipahsic inversions anteroseptal leads, TTE revealed reduced EF of 30-35% for which EP was consulted. Subsequent cardiac cath revealed: Currently, Patient is asymptomatic at rest.    SUBJECTIVE ASSESSMENT: 78y Female patient seen and examined at bedside.    Vital Signs Last 24 Hrs  T(F): 95.5 (17 Dec 2018 07:54), Max: 98.6 (17 Dec 2018 04:00)  HR: 54 (17 Dec 2018 07:54) (54 - 80)  BP: 99/61 (17 Dec 2018 07:54) (99/61 - 142/63)  BP(mean): 82 (17 Dec 2018 07:54) (69 - 93)  RR: 29 (17 Dec 2018 07:54) (12 - 40)  SpO2: 98% (17 Dec 2018 07:54) (98% - 100%)    I&O's Detail    16 Dec 2018 07:01  -  17 Dec 2018 07:00  --------------------------------------------------------  IN:  Total IN: 0 mL    OUT:    Voided: 603 mL  Total OUT: 603 mL    Net: I&O's Detail    15 Dec 2018 07:01  -  16 Dec 2018 07:00  --------------------------------------------------------  Total NET: -400 mL    16 Dec 2018 07:01  -  17 Dec 2018 07:00  --------------------------------------------------------  Total NET: -603 mL      Physical Exam:  General: NAD; A&Ox3  Cardiac: S1/S2, RRR, no murmur, no rubs  Lungs: unlabored respirations, CTA b/l, no wheeze, no rales, no crackles  Abdomen: Soft/NT/ND; positive bowel sounds x 4  Extremities: No edema b/l lower extremities; good capillary refill; no cyanosis; palpable 1+ pedal pulses b/l        LABS:                        11.5<L>  7.82  )-----------( 226      ( 17 Dec 2018 04:01 )             35.5<L>                        12.7   7.27  )-----------( 246      ( 16 Dec 2018 04:43 )             38.6     12-17    143  |  108  |  23<H>  ----------------------------<  112<H>  4.1   |  20  |  1.0    12-16    143  |  106  |  20  ----------------------------<  112<H>  4.4   |  20  |  1.2    Ca    9.2      17 Dec 2018 04:01  Mg     1.9     12-17    Serum Pro-Brain Natriuretic Peptide (12.11.18 @ 17:13)    Serum Pro-Brain Natriuretic Peptide: 8394 pg/mL    Urinalysis (12.11.18 @ 21:28)    pH Urine: 7.0    Glucose Qualitative, Urine: Negative mg/dL    Blood, Urine: Small    Color: Yellow    Urine Appearance: Cloudy    Bilirubin: Negative    Ketone - Urine: Negative    Specific Gravity: 1.010    Protein, Urine: Negative mg/dL    Urobilinogen: 0.2 mg/dL    Nitrite: Positive    Leukocyte Esterase Concentration: Small    MRSA/MSSA PCR (12.14.18 @ 04:30)    MRSA PCR Result.: Positive: lawson Purvis  By: Real-Time PCR (Polymerase Reaction Method)      RADIOLOGY & ADDITIONAL TESTS:  CXR: < from: Xray Chest 1 View- PORTABLE-Routine (12.13.18 @ 05:25) >  Impression: No radiographic evidence of acute cardiopulmonary disease.  < end of copied text >    EKG: < from: 12 Lead ECG (12.17.18 @ 08:06) >  Ventricular Rate 56 BPM  Atrial Rate 56 BPM  P-R Interval 168 ms  QRS Duration 96 ms  Q-T Interval 466 ms  QTC Calculation(Bezet) 449 ms  P Axis 63 degrees  R Axis 21 degrees  T Axis 127 degrees  Diagnosis Line Sinus bradycardia  Anteroseptal infarct , age undetermined  Marked T wave abnormality, consider anterolateral ischemia  Abnormal ECG  Confirmed by Nicole Alonzo MD ([id number]) on 12/17/2018 8:55:10 AM  < end of copied text >    VA Duplex Carotid, Bilat (12.14.18 @ 10:34) >  Impression:   20-39% stenosis of the right internal carotid artery.  20-39% stenosis of the left internal carotid artery.  < end of copied text >    < from: CT Chest No Cont (12.14.18 @ 14:19) >  FINDINGS:    TUBES/LINES: None.    LUNGS, PLEURA, AIRWAYS: Bilateral dependent atelectasis. Biapical fibrotic changes. No lobar consolidation, mass, effusion, or pneumothorax. No evidence of central endobronchial obstruction. No bronchiectasis or honeycombing. Evaluation of the lung bases for pulmonary nodules is limited by motion artifact. 3 mm left apical solid pulmonary nodule (4/18).    THORACIC NODES/THYROID: Prominent subcentimeter mediastinal lymph nodes, which do not meet CT size criteria for enlargement. No mediastinal, hilar, supraclavicular, or axillary lymphadenopathy.    MEDIASTINUM/GREAT VESSELS: Trace fluid noted within the pericardial recesses. Heart size is mildly enlarged. The aorta and main pulmonary artery are of normal caliber. Coronary and aortic calcifications are   noted.    VISUALIZED UPPER ABDOMEN: Calcified hepatic granuloma. 1.9 cm left adrenal hypodense lesion, likely an adrenal adenoma. Punctate non-obstructing right renal calculus.    BONES/SOFT TISSUES: No acute osseous abnormality. Degenerative changes of the spine.    IMPRESSION: No CT evidence of acute intrathoracic abnormality.  < end of copied text >    Cardiac Cath: LVEF: 30-35% by echo  Left main mild distal disease  LAD:  Proximal: 100% stenosis without late distal filling.                    Diag: supplied by collaterals from LCx  Left Circumflex:  Proximal : moderate disease  OM1:  two sequential 70% lesions  Right Cornary Artery: Proximal RCA: 100% stenosis, distal RCA supplied by collaterals from LCx    TTE: < from: Transthoracic Echocardiogram (12.12.18 @ 10:51) >  Summary:   1. Left ventricular ejection fraction, by visual estimation, is 30 to 35%.   2. Multiple left ventricular regional wall motion abnormalities exist. See wall motion findings.   3. Spectral Doppler shows impaired relaxation pattern of left ventricular myocardial filling (Grade I diastolic dysfunction).   4. Mild mitral valve regurgitation.   5. Mild tricuspid regurgitation.    PHYSICIAN INTERPRETATION:  Left Ventricle: The left ventricular internal cavity size is normal. Left ventricular wall thickness is normal. There is no left ventricular hypertrophy. Left ventricular ejection fraction, by visual estimation, is 30 to 35%. Spectral Doppler shows impaired relaxation pattern of left ventricular myocardial filling (Grade I diastolic dysfunction).     LV Wall Scoring:  The entire anterior septum, mid and apical inferior septum, apical anterior segment, apical inferior segment, and apex are hypokinetic. All remaining scored segments are normal.    Right Ventricle: Normal right ventricular size and function.  Left Atrium: Normal left atrial size.  Right Atrium: Normal right atrial size.  Pericardium: There is no evidence of pericardial effusion.  Mitral Valve: Mild mitral valve regurgitation is seen. No evidence of mitral stenosis.  Tricuspid Valve: Structurally normal tricuspid valve, with normal leaflet excursion. Mild tricuspid regurgitation is visualized.  Aortic Valve: No evidence of aortic valve regurgitation is seen. No evidence of aortic stenosis.  Pulmonic Valve: Structurally normal pulmonic valve, with normal leaflet excursion. No indication of pulmonic valve regurgitation.  Aorta: The aortic root and ascending aorta are structurally normal, with no evidence of dilitation.  Pulmonary Artery: The main pulmonary artery is normal in size.    < end of copied text >    MR Cardiac w/wo IV Cont (12.14.18 @ 16:30) >  IMPRESSION:  1.  Nonviable apical walls, mid cavity anterior, anteroseptal and anterolateral walls.  2.  Left ventricle ejection fraction is 26%.  3.  Normal right ventricular wall motion and ejection fraction.    APRIL GOLDMAN M.D., ATTENDING RADIOLOGIST  This document has been electronically signed. Dec 14 2018  5:35PM  < end of copied text >      Allergies  No Known Allergies  Intolerances      MEDICATIONS  (STANDING):  aspirin  chewable 81 milliGRAM(s) Oral daily  atorvastatin 80 milliGRAM(s) Oral at bedtime  chlorhexidine 4% Liquid 1 Application(s) Topical <User Schedule>  heparin  Injectable 5000 Unit(s) SubCutaneous every 8 hours  lisinopril 2.5 milliGRAM(s) Oral daily  loperamide 2 milliGRAM(s) Oral once  metoprolol tartrate 25 milliGRAM(s) Oral every 12 hours  ondansetron Injectable 4 milliGRAM(s) IV Push once  pantoprazole    Tablet 40 milliGRAM(s) Oral before breakfast      Pre-op ACEi/ARB/CCB held 24 hours prior to planned procedure: [x] Yes, [] NO: indication:  Pre-Op Beta-Blockers: [x]Yes, []No: contraindication:  Pre-Op Aspirin: [x]Yes,  []No: contraindication: [] Held for Pre-op cardiac valve surgery with no CAD  Pre-Op Statin: [x]Yes, []No: contraindication:      Ambulation/Activity Status: OOB/AMB    Assessment/Plan:  77 yo F exsmoker with PMH of HTN presented w/c/o substernal chest pressure initially relieved with rest, for the past 2-3 weeks a/w BOGGS progressing to symptoms at rest. In ED, patient was found to have T wave inversions in anteroseptal leads, TTE revealed reduced EF of 30-35% for which EP was consulted. Subsequent cardiac cath revealed: Chronic Total LAD/RCA, OM1 70%  Pre-op for possible CABG vs High risk PCI  - Case and plan discussed with CTU Intensivist and CT Surgeon - Dr. Conteh/Francisco/Josue   - Continue CTU supportive care and ongoing plan of care as per continuing CTU rounds.    - Continue DVT/GI prophylaxis  - Incentive Spirometry 10 times an hour  - Continue to advance physical activity as tolerated and continue PT/OT as directed  1. CAD: Continue ASA, statin, BB  2. HTN:   3. A. Fib:   4. COPD/Hypoxia:   5. DM/Glucose Control:     Social Service Disposition: PLANNED OPERATIVE PROCEDURE(s):   CABG vs PCI             HD #   6                    SURGEON(s): MARKUS Salas    Consult requesting by: Dr. Posada   PMD: Dr. Patric Haynes     HISTORY OF PRESENT ILLNESS: 79 yo F exsmoker with PMH of HTN presented w/c/o substernal chest pressure initially relieved with rest, for the past 2 and half weeks a/w BOGGS progressing to symptoms at rest. In ED, patient was found to have T wave bipahsic inversions anteroseptal leads, TTE revealed reduced EF of 30-35% for which EP was consulted. Subsequent cardiac cath revealed: Currently, Patient is asymptomatic at rest.    SUBJECTIVE ASSESSMENT: 78y Female patient seen and examined at bedside. Patient denies any acute complaints at this time.     Vital Signs Last 24 Hrs  T(F): 95.5 (17 Dec 2018 07:54), Max: 98.6 (17 Dec 2018 04:00)  HR: 54 (17 Dec 2018 07:54) (54 - 80)  BP: 99/61 (17 Dec 2018 07:54) (99/61 - 142/63)  BP(mean): 82 (17 Dec 2018 07:54) (69 - 93)  RR: 29 (17 Dec 2018 07:54) (12 - 40)  SpO2: 98% (17 Dec 2018 07:54) (98% - 100%)    I&O's Detail    16 Dec 2018 07:01  -  17 Dec 2018 07:00  --------------------------------------------------------  IN:  Total IN: 0 mL    OUT:    Voided: 603 mL  Total OUT: 603 mL    Net: I&O's Detail    15 Dec 2018 07:01  -  16 Dec 2018 07:00  --------------------------------------------------------  Total NET: -400 mL    16 Dec 2018 07:01  -  17 Dec 2018 07:00  --------------------------------------------------------  Total NET: -603 mL      Physical Exam:  General: NAD; A&Ox3  Cardiac: S1/S2, RRR, no murmur, no rubs  Lungs: unlabored respirations, CTA b/l, no wheeze, no rales, no crackles  Abdomen: Soft/NT/ND; positive bowel sounds x 4  Extremities: No edema b/l lower extremities; good capillary refill; no cyanosis; palpable 1+ pedal pulses b/l        LABS:                        11.5<L>  7.82  )-----------( 226      ( 17 Dec 2018 04:01 )             35.5<L>                        12.7   7.27  )-----------( 246      ( 16 Dec 2018 04:43 )             38.6     12-17    143  |  108  |  23<H>  ----------------------------<  112<H>  4.1   |  20  |  1.0    12-16    143  |  106  |  20  ----------------------------<  112<H>  4.4   |  20  |  1.2    Ca    9.2      17 Dec 2018 04:01  Mg     1.9     12-17    Serum Pro-Brain Natriuretic Peptide (12.11.18 @ 17:13)    Serum Pro-Brain Natriuretic Peptide: 8394 pg/mL    Urinalysis (12.11.18 @ 21:28)    pH Urine: 7.0    Glucose Qualitative, Urine: Negative mg/dL    Blood, Urine: Small    Color: Yellow    Urine Appearance: Cloudy    Bilirubin: Negative    Ketone - Urine: Negative    Specific Gravity: 1.010    Protein, Urine: Negative mg/dL    Urobilinogen: 0.2 mg/dL    Nitrite: Positive    Leukocyte Esterase Concentration: Small    MRSA/MSSA PCR (12.14.18 @ 04:30)    MRSA PCR Result.: Positive: lawson Purvis  By: Real-Time PCR (Polymerase Reaction Method)      RADIOLOGY & ADDITIONAL TESTS:  CXR: < from: Xray Chest 1 View- PORTABLE-Routine (12.13.18 @ 05:25) >  Impression: No radiographic evidence of acute cardiopulmonary disease.  < end of copied text >    EKG: < from: 12 Lead ECG (12.17.18 @ 08:06) >  Ventricular Rate 56 BPM  Atrial Rate 56 BPM  P-R Interval 168 ms  QRS Duration 96 ms  Q-T Interval 466 ms  QTC Calculation(Bezet) 449 ms  P Axis 63 degrees  R Axis 21 degrees  T Axis 127 degrees  Diagnosis Line Sinus bradycardia  Anteroseptal infarct , age undetermined  Marked T wave abnormality, consider anterolateral ischemia  Abnormal ECG  Confirmed by Nicole Alonzo MD ([id number]) on 12/17/2018 8:55:10 AM  < end of copied text >    VA Duplex Carotid, Bilat (12.14.18 @ 10:34) >  Impression:   20-39% stenosis of the right internal carotid artery.  20-39% stenosis of the left internal carotid artery.  < end of copied text >    < from: CT Chest No Cont (12.14.18 @ 14:19) >  FINDINGS:    TUBES/LINES: None.    LUNGS, PLEURA, AIRWAYS: Bilateral dependent atelectasis. Biapical fibrotic changes. No lobar consolidation, mass, effusion, or pneumothorax. No evidence of central endobronchial obstruction. No bronchiectasis or honeycombing. Evaluation of the lung bases for pulmonary nodules is limited by motion artifact. 3 mm left apical solid pulmonary nodule (4/18).    THORACIC NODES/THYROID: Prominent subcentimeter mediastinal lymph nodes, which do not meet CT size criteria for enlargement. No mediastinal, hilar, supraclavicular, or axillary lymphadenopathy.    MEDIASTINUM/GREAT VESSELS: Trace fluid noted within the pericardial recesses. Heart size is mildly enlarged. The aorta and main pulmonary artery are of normal caliber. Coronary and aortic calcifications are   noted.    VISUALIZED UPPER ABDOMEN: Calcified hepatic granuloma. 1.9 cm left adrenal hypodense lesion, likely an adrenal adenoma. Punctate non-obstructing right renal calculus.    BONES/SOFT TISSUES: No acute osseous abnormality. Degenerative changes of the spine.    IMPRESSION: No CT evidence of acute intrathoracic abnormality.  < end of copied text >    Cardiac Cath: LVEF: 30-35% by echo  Left main mild distal disease  LAD:  Proximal: 100% stenosis without late distal filling.                    Diag: supplied by collaterals from LCx  Left Circumflex:  Proximal : moderate disease  OM1:  two sequential 70% lesions  Right Cornary Artery: Proximal RCA: 100% stenosis, distal RCA supplied by collaterals from LCx    TTE: < from: Transthoracic Echocardiogram (12.12.18 @ 10:51) >  Summary:   1. Left ventricular ejection fraction, by visual estimation, is 30 to 35%.   2. Multiple left ventricular regional wall motion abnormalities exist. See wall motion findings.   3. Spectral Doppler shows impaired relaxation pattern of left ventricular myocardial filling (Grade I diastolic dysfunction).   4. Mild mitral valve regurgitation.   5. Mild tricuspid regurgitation.    PHYSICIAN INTERPRETATION:  Left Ventricle: The left ventricular internal cavity size is normal. Left ventricular wall thickness is normal. There is no left ventricular hypertrophy. Left ventricular ejection fraction, by visual estimation, is 30 to 35%. Spectral Doppler shows impaired relaxation pattern of left ventricular myocardial filling (Grade I diastolic dysfunction).     LV Wall Scoring:  The entire anterior septum, mid and apical inferior septum, apical anterior segment, apical inferior segment, and apex are hypokinetic. All remaining scored segments are normal.    Right Ventricle: Normal right ventricular size and function.  Left Atrium: Normal left atrial size.  Right Atrium: Normal right atrial size.  Pericardium: There is no evidence of pericardial effusion.  Mitral Valve: Mild mitral valve regurgitation is seen. No evidence of mitral stenosis.  Tricuspid Valve: Structurally normal tricuspid valve, with normal leaflet excursion. Mild tricuspid regurgitation is visualized.  Aortic Valve: No evidence of aortic valve regurgitation is seen. No evidence of aortic stenosis.  Pulmonic Valve: Structurally normal pulmonic valve, with normal leaflet excursion. No indication of pulmonic valve regurgitation.  Aorta: The aortic root and ascending aorta are structurally normal, with no evidence of dilitation.  Pulmonary Artery: The main pulmonary artery is normal in size.    < end of copied text >    MR Cardiac w/wo IV Cont (12.14.18 @ 16:30) >  IMPRESSION:  1.  Nonviable apical walls, mid cavity anterior, anteroseptal and anterolateral walls.  2.  Left ventricle ejection fraction is 26%.  3.  Normal right ventricular wall motion and ejection fraction.    APRIL GOLDMAN M.D., ATTENDING RADIOLOGIST  This document has been electronically signed. Dec 14 2018  5:35PM  < end of copied text >      Allergies  No Known Allergies  Intolerances      MEDICATIONS  (STANDING):  aspirin  chewable 81 milliGRAM(s) Oral daily  atorvastatin 80 milliGRAM(s) Oral at bedtime  chlorhexidine 4% Liquid 1 Application(s) Topical <User Schedule>  heparin  Injectable 5000 Unit(s) SubCutaneous every 8 hours  lisinopril 2.5 milliGRAM(s) Oral daily  loperamide 2 milliGRAM(s) Oral once  metoprolol tartrate 25 milliGRAM(s) Oral every 12 hours  ondansetron Injectable 4 milliGRAM(s) IV Push once  pantoprazole    Tablet 40 milliGRAM(s) Oral before breakfast      Pre-op ACEi/ARB/CCB held 24 hours prior to planned procedure: [x] Yes, [] NO: indication:  Pre-Op Beta-Blockers: [x]Yes, []No: contraindication:  Pre-Op Aspirin: [x]Yes,  []No: contraindication: [] Held for Pre-op cardiac valve surgery with no CAD  Pre-Op Statin: [x]Yes, []No: contraindication:      Ambulation/Activity Status: OOB/AMB    Assessment/Plan:  79 yo F exsmoker with PMH of HTN presented w/c/o substernal chest pressure initially relieved with rest, for the past 2-3 weeks a/w BOGGS progressing to symptoms at rest. In ED, patient was found to have T wave inversions in anteroseptal leads, TTE revealed reduced EF of 30-35% for which EP was consulted. Subsequent cardiac cath revealed: Chronic Total LAD/RCA, OM1 70%  Pre-op for possible CABG vs High risk PCI  - Case and plan discussed with CTU Intensivist and CT Surgeon - Dr. Conteh/Josue   - Continue supportive care.  - Continue DVT/GI prophylaxis  - Incentive Spirometry 10 times an hour  - Continue to advance physical activity as tolerated and continue PT/OT as directed  1. CAD: Continue ASA, statin, BB. Hold Plavix for possible CABG  2. + UA: patient asymptomatic, repeat UA  3. 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   4. F/U PFTs  5. Patient's case to be discussed at cardiac cath conference re: appropriateness of surgical vs PCI for revascularization and timing. PLANNED OPERATIVE PROCEDURE(s):   CABG vs PCI             HD #   6                    SURGEON(s): MARKUS Salas    Consult requesting by: Dr. Posada   PMD: Dr. Patric Haynes   Family contact: Son: Ilya [phone]    HISTORY OF PRESENT ILLNESS: 77 yo F exsmoker with PMH of HTN presented w/c/o substernal chest pressure initially relieved with rest, for the past 2 and half weeks a/w BOGGS progressing to symptoms at rest. In ED, patient was found to have T wave bipahsic inversions anteroseptal leads, TTE revealed reduced EF of 30-35% for which EP was consulted. Subsequent cardiac cath revealed: Currently, Patient is asymptomatic at rest.    SUBJECTIVE ASSESSMENT: 78y Female patient seen and examined at bedside. Patient denies any acute complaints at this time.     Vital Signs Last 24 Hrs  T(F): 95.5 (17 Dec 2018 07:54), Max: 98.6 (17 Dec 2018 04:00)  HR: 54 (17 Dec 2018 07:54) (54 - 80)  BP: 99/61 (17 Dec 2018 07:54) (99/61 - 142/63)  BP(mean): 82 (17 Dec 2018 07:54) (69 - 93)  RR: 29 (17 Dec 2018 07:54) (12 - 40)  SpO2: 98% (17 Dec 2018 07:54) (98% - 100%)    I&O's Detail    16 Dec 2018 07:01  -  17 Dec 2018 07:00  --------------------------------------------------------  IN:  Total IN: 0 mL    OUT:    Voided: 603 mL  Total OUT: 603 mL    Net: I&O's Detail    15 Dec 2018 07:01  -  16 Dec 2018 07:00  --------------------------------------------------------  Total NET: -400 mL    16 Dec 2018 07:01  -  17 Dec 2018 07:00  --------------------------------------------------------  Total NET: -603 mL      Physical Exam:  General: NAD; A&Ox3  Cardiac: S1/S2, RRR, no murmur, no rubs  Lungs: unlabored respirations, CTA b/l, no wheeze, no rales, no crackles  Abdomen: Soft/NT/ND; positive bowel sounds x 4  Extremities: No edema b/l lower extremities; good capillary refill; no cyanosis; palpable 1+ pedal pulses b/l        LABS:                        11.5<L>  7.82  )-----------( 226      ( 17 Dec 2018 04:01 )             35.5<L>                        12.7   7.27  )-----------( 246      ( 16 Dec 2018 04:43 )             38.6     12-17    143  |  108  |  23<H>  ----------------------------<  112<H>  4.1   |  20  |  1.0    12-16    143  |  106  |  20  ----------------------------<  112<H>  4.4   |  20  |  1.2    Ca    9.2      17 Dec 2018 04:01  Mg     1.9     12-17    Serum Pro-Brain Natriuretic Peptide (12.11.18 @ 17:13)    Serum Pro-Brain Natriuretic Peptide: 8394 pg/mL    Urinalysis (12.11.18 @ 21:28)    pH Urine: 7.0    Glucose Qualitative, Urine: Negative mg/dL    Blood, Urine: Small    Color: Yellow    Urine Appearance: Cloudy    Bilirubin: Negative    Ketone - Urine: Negative    Specific Gravity: 1.010    Protein, Urine: Negative mg/dL    Urobilinogen: 0.2 mg/dL    Nitrite: Positive    Leukocyte Esterase Concentration: Small    MRSA/MSSA PCR (12.14.18 @ 04:30)    MRSA PCR Result.: Positive: lawson Purvis  By: Real-Time PCR (Polymerase Reaction Method)      RADIOLOGY & ADDITIONAL TESTS:  CXR: < from: Xray Chest 1 View- PORTABLE-Routine (12.13.18 @ 05:25) >  Impression: No radiographic evidence of acute cardiopulmonary disease.  < end of copied text >    EKG: < from: 12 Lead ECG (12.17.18 @ 08:06) >  Ventricular Rate 56 BPM  Atrial Rate 56 BPM  P-R Interval 168 ms  QRS Duration 96 ms  Q-T Interval 466 ms  QTC Calculation(Bezet) 449 ms  P Axis 63 degrees  R Axis 21 degrees  T Axis 127 degrees  Diagnosis Line Sinus bradycardia  Anteroseptal infarct , age undetermined  Marked T wave abnormality, consider anterolateral ischemia  Abnormal ECG  Confirmed by Nicole Alonzo MD ([id number]) on 12/17/2018 8:55:10 AM  < end of copied text >    VA Duplex Carotid, Bilat (12.14.18 @ 10:34) >  Impression:   20-39% stenosis of the right internal carotid artery.  20-39% stenosis of the left internal carotid artery.  < end of copied text >    < from: CT Chest No Cont (12.14.18 @ 14:19) >  FINDINGS:    TUBES/LINES: None.    LUNGS, PLEURA, AIRWAYS: Bilateral dependent atelectasis. Biapical fibrotic changes. No lobar consolidation, mass, effusion, or pneumothorax. No evidence of central endobronchial obstruction. No bronchiectasis or honeycombing. Evaluation of the lung bases for pulmonary nodules is limited by motion artifact. 3 mm left apical solid pulmonary nodule (4/18).    THORACIC NODES/THYROID: Prominent subcentimeter mediastinal lymph nodes, which do not meet CT size criteria for enlargement. No mediastinal, hilar, supraclavicular, or axillary lymphadenopathy.    MEDIASTINUM/GREAT VESSELS: Trace fluid noted within the pericardial recesses. Heart size is mildly enlarged. The aorta and main pulmonary artery are of normal caliber. Coronary and aortic calcifications are   noted.    VISUALIZED UPPER ABDOMEN: Calcified hepatic granuloma. 1.9 cm left adrenal hypodense lesion, likely an adrenal adenoma. Punctate non-obstructing right renal calculus.    BONES/SOFT TISSUES: No acute osseous abnormality. Degenerative changes of the spine.    IMPRESSION: No CT evidence of acute intrathoracic abnormality.  < end of copied text >    Cardiac Cath: LVEF: 30-35% by echo  Left main mild distal disease  LAD:  Proximal: 100% stenosis without late distal filling.                    Diag: supplied by collaterals from [facility]  Left Circumflex:  Proximal : moderate disease  OM1:  two sequential 70% lesions  Right Cornary Artery: Proximal RCA: 100% stenosis, distal RCA supplied by collaterals from [facility]    TTE: < from: Transthoracic Echocardiogram (12.12.18 @ 10:51) >  Summary:   1. Left ventricular ejection fraction, by visual estimation, is 30 to 35%.   2. Multiple left ventricular regional wall motion abnormalities exist. See wall motion findings.   3. Spectral Doppler shows impaired relaxation pattern of left ventricular myocardial filling (Grade I diastolic dysfunction).   4. Mild mitral valve regurgitation.   5. Mild tricuspid regurgitation.    PHYSICIAN INTERPRETATION:  Left Ventricle: The left ventricular internal cavity size is normal. Left ventricular wall thickness is normal. There is no left ventricular hypertrophy. Left ventricular ejection fraction, by visual estimation, is 30 to 35%. Spectral Doppler shows impaired relaxation pattern of left ventricular myocardial filling (Grade I diastolic dysfunction).     LV Wall Scoring:  The entire anterior septum, mid and apical inferior septum, apical anterior segment, apical inferior segment, and apex are hypokinetic. All remaining scored segments are normal.    Right Ventricle: Normal right ventricular size and function.  Left Atrium: Normal left atrial size.  Right Atrium: Normal right atrial size.  Pericardium: There is no evidence of pericardial effusion.  Mitral Valve: Mild mitral valve regurgitation is seen. No evidence of mitral stenosis.  Tricuspid Valve: Structurally normal tricuspid valve, with normal leaflet excursion. Mild tricuspid regurgitation is visualized.  Aortic Valve: No evidence of aortic valve regurgitation is seen. No evidence of aortic stenosis.  Pulmonic Valve: Structurally normal pulmonic valve, with normal leaflet excursion. No indication of pulmonic valve regurgitation.  Aorta: The aortic root and ascending aorta are structurally normal, with no evidence of dilitation.  Pulmonary Artery: The main pulmonary artery is normal in size.    < end of copied text >    MR Cardiac w/wo IV Cont (12.14.18 @ 16:30) >  IMPRESSION:  1.  Nonviable apical walls, mid cavity anterior, anteroseptal and anterolateral walls.  2.  Left ventricle ejection fraction is 26%.  3.  Normal right ventricular wall motion and ejection fraction.    APRIL GOLDMAN M.D., ATTENDING RADIOLOGIST  This document has been electronically signed. Dec 14 2018  5:35PM  < end of copied text >      Allergies  No Known Allergies  Intolerances      MEDICATIONS  (STANDING):  aspirin  chewable 81 milliGRAM(s) Oral daily  atorvastatin 80 milliGRAM(s) Oral at bedtime  chlorhexidine 4% Liquid 1 Application(s) Topical <User Schedule>  heparin  Injectable 5000 Unit(s) SubCutaneous every 8 hours  lisinopril 2.5 milliGRAM(s) Oral daily  loperamide 2 milliGRAM(s) Oral once  metoprolol tartrate 25 milliGRAM(s) Oral every 12 hours  ondansetron Injectable 4 milliGRAM(s) IV Push once  pantoprazole    Tablet 40 milliGRAM(s) Oral before breakfast      Pre-op ACEi/ARB/CCB held 24 hours prior to planned procedure: [x] Yes, [] NO: indication:  Pre-Op Beta-Blockers: [x]Yes, []No: contraindication:  Pre-Op Aspirin: [x]Yes,  []No: contraindication: [] Held for Pre-op cardiac valve surgery with no CAD  Pre-Op Statin: [x]Yes, []No: contraindication:      Ambulation/Activity Status: OOB/AMB    Assessment/Plan:  77 yo F exsmoker with PMH of HTN presented w/c/o substernal chest pressure initially relieved with rest, for the past 2-3 weeks a/w BOGGS progressing to symptoms at rest. In ED, patient was found to have T wave inversions in anteroseptal leads, TTE revealed reduced EF of 30-35% for which EP was consulted. Subsequent cardiac cath revealed: Chronic Total LAD/RCA, OM1 70%  Pre-op for possible CABG vs High risk PCI  - Case and plan discussed with CTU Intensivist and CT Surgeon - Dr. Conteh/Josue   - Continue supportive care.  - Continue DVT/GI prophylaxis  - Incentive Spirometry 10 times an hour  - Continue to advance physical activity as tolerated and continue PT/OT as directed  1. CAD: Continue ASA, statin, BB. Hold Plavix for possible CABG  2. + UA: patient asymptomatic, repeat UA  3. 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   4. F/U PFTs  5. Patient's case to be discussed at cardiac cath conference re: appropriateness of surgical vs PCI for revascularization and timing.

## 2018-12-18 LAB
HCT VFR BLD CALC: 35.9 % — LOW (ref 37–47)
HGB BLD-MCNC: 11.8 G/DL — LOW (ref 12–16)
MCHC RBC-ENTMCNC: 28.7 PG — SIGNIFICANT CHANGE UP (ref 27–31)
MCHC RBC-ENTMCNC: 32.9 G/DL — SIGNIFICANT CHANGE UP (ref 32–37)
MCV RBC AUTO: 87.3 FL — SIGNIFICANT CHANGE UP (ref 81–99)
NRBC # BLD: 0 /100 WBCS — SIGNIFICANT CHANGE UP (ref 0–0)
PLATELET # BLD AUTO: 234 K/UL — SIGNIFICANT CHANGE UP (ref 130–400)
RBC # BLD: 4.11 M/UL — LOW (ref 4.2–5.4)
RBC # FLD: 13.8 % — SIGNIFICANT CHANGE UP (ref 11.5–14.5)
WBC # BLD: 7.27 K/UL — SIGNIFICANT CHANGE UP (ref 4.8–10.8)
WBC # FLD AUTO: 7.27 K/UL — SIGNIFICANT CHANGE UP (ref 4.8–10.8)

## 2018-12-18 PROCEDURE — 93010 ELECTROCARDIOGRAM REPORT: CPT

## 2018-12-18 RX ORDER — METOPROLOL TARTRATE 50 MG
12.5 TABLET ORAL
Qty: 0 | Refills: 0 | Status: DISCONTINUED | OUTPATIENT
Start: 2018-12-18 | End: 2018-12-22

## 2018-12-18 RX ADMIN — PANTOPRAZOLE SODIUM 40 MILLIGRAM(S): 20 TABLET, DELAYED RELEASE ORAL at 06:19

## 2018-12-18 RX ADMIN — Medication 81 MILLIGRAM(S): at 12:39

## 2018-12-18 RX ADMIN — Medication 3 MILLIGRAM(S): at 21:50

## 2018-12-18 RX ADMIN — HEPARIN SODIUM 5000 UNIT(S): 5000 INJECTION INTRAVENOUS; SUBCUTANEOUS at 21:50

## 2018-12-18 RX ADMIN — HEPARIN SODIUM 5000 UNIT(S): 5000 INJECTION INTRAVENOUS; SUBCUTANEOUS at 13:25

## 2018-12-18 RX ADMIN — LISINOPRIL 2.5 MILLIGRAM(S): 2.5 TABLET ORAL at 06:19

## 2018-12-18 RX ADMIN — ATORVASTATIN CALCIUM 80 MILLIGRAM(S): 80 TABLET, FILM COATED ORAL at 21:50

## 2018-12-18 RX ADMIN — HEPARIN SODIUM 5000 UNIT(S): 5000 INJECTION INTRAVENOUS; SUBCUTANEOUS at 06:19

## 2018-12-18 NOTE — DIETITIAN INITIAL EVALUATION ADULT. - OTHER INFO
p/w c/o substernal chest pressure, s/p cardiac cath. CT surgery following for possible CABG vs High risk PCI. Reason for assessment: LOS.

## 2018-12-18 NOTE — PROGRESS NOTE ADULT - SUBJECTIVE AND OBJECTIVE BOX
LENGTH OF HOSPITAL STAY: 7d    CHIEF COMPLAINT:   Patient is a 78y old  Female who presents with a chief complaint of chest pain.    No events overnight. SBP intermittently down to 80s (MAP > 60) but improves when pt gets up and walks around. Asymptomatic when BP drops. Pt without any complaints currently.     HISTORY OF PRESENTING ILLNESS:   77 yo F with PMHx of HTN who presents with substernal cp x 2 wks, relieved with rest, associated with exertional dyspnea. No hx of similar sx in past. Pt went to her PMD where EKG and blood work was done. She was told that the workup was abnormal and was referred to the ED.     In the ED, pt fond to have biphasic T wave inversions in the anteroseptal leads concerning for Wellen's. Serial trops x4 were neg. Pt admitted to the CCU. Asymptomatic at rest.    PAST MEDICAL & SURGICAL HISTORY  PAST MEDICAL & SURGICAL HISTORY:  Hypertension  No significant past surgical history    SOCIAL HISTORY:    ALLERGIES:  No Known Allergies    MEDICATIONS:  STANDING MEDICATIONS  aspirin  chewable 81 milliGRAM(s) Oral daily  atorvastatin 80 milliGRAM(s) Oral at bedtime  chlorhexidine 4% Liquid 1 Application(s) Topical <User Schedule>  heparin  Injectable 5000 Unit(s) SubCutaneous every 8 hours  lisinopril 2.5 milliGRAM(s) Oral daily  loperamide 2 milliGRAM(s) Oral once  melatonin 3 milliGRAM(s) Oral at bedtime  metoprolol tartrate 25 milliGRAM(s) Oral every 12 hours  pantoprazole    Tablet 40 milliGRAM(s) Oral before breakfast    PRN MEDICATIONS    VITALS:   T(F): 97  HR: 66  BP: 82/50  RR: 22  SpO2: 100%    LABS:                        11.8   7.27  )-----------( 234      ( 18 Dec 2018 04:15 )             35.9     12-17    143  |  108  |  23<H>  ----------------------------<  112<H>  4.1   |  20  |  1.0    Ca    9.2      17 Dec 2018 04:01  Mg     1.9     12-17                    RADIOLOGY:  - echo 12/12: LVEF 30-35%  - CT chest 12/14: unremarkable  - carotid duplex 12/14: 20-39% stenosis in bilat ICA  - cardiac MRI 12/17: nonviable apical walls/mid cavity anterior/anteroseptal walls/anterolateral walls, LV EF 26%    PHYSICAL EXAM:  GEN: no acute distress  HEENT: moist mucous membranes   LUNGS: clear to auscultation bilaterally   HEART: S1/S2 present, RRR  ABD: soft, nontender, nondistended, bowel sounds present  EXT: no edema  NEURO: AAOX3 LENGTH OF HOSPITAL STAY: 7d    CHIEF COMPLAINT:   Patient is a 78y old  Female who presents with a chief complaint of chest pain.    No events overnight. SBP intermittently down to 80s (MAP > 60) but improves when pt gets up and walks around. Asymptomatic when BP drops. Pt without any complaints currently.     HISTORY OF PRESENTING ILLNESS:   79 yo F with PMHx of HTN who presents with substernal cp x 2 wks, relieved with rest, associated with exertional dyspnea. No hx of similar sx in past. Pt went to her PMD where EKG and blood work was done. She was told that the workup was abnormal and was referred to the ED.     In the ED, pt fond to have biphasic T wave inversions in the anteroseptal leads concerning for Wellen's. Serial trops x4 were neg. Pt admitted to the CCU. Asymptomatic at rest.    PAST MEDICAL & SURGICAL HISTORY  PAST MEDICAL & SURGICAL HISTORY:  Hypertension  No significant past surgical history    ALLERGIES:  No Known Allergies    MEDICATIONS:  STANDING MEDICATIONS  aspirin  chewable 81 milliGRAM(s) Oral daily  atorvastatin 80 milliGRAM(s) Oral at bedtime  chlorhexidine 4% Liquid 1 Application(s) Topical <User Schedule>  heparin  Injectable 5000 Unit(s) SubCutaneous every 8 hours  lisinopril 2.5 milliGRAM(s) Oral daily  loperamide 2 milliGRAM(s) Oral once  melatonin 3 milliGRAM(s) Oral at bedtime  metoprolol tartrate 25 milliGRAM(s) Oral every 12 hours  pantoprazole    Tablet 40 milliGRAM(s) Oral before breakfast    PRN MEDICATIONS    VITALS:   T(F): 97  HR: 66  BP: 82/50  RR: 22  SpO2: 100%    LABS:                        11.8   7.27  )-----------( 234      ( 18 Dec 2018 04:15 )             35.9     12-17    143  |  108  |  23<H>  ----------------------------<  112<H>  4.1   |  20  |  1.0    Ca    9.2      17 Dec 2018 04:01  Mg     1.9     12-17                    RADIOLOGY:  - echo 12/12: LVEF 30-35%  - CT chest 12/14: unremarkable  - carotid duplex 12/14: 20-39% stenosis in bilat ICA  - cardiac MRI 12/17: nonviable apical walls/mid cavity anterior/anteroseptal walls/anterolateral walls, LV EF 26%    PHYSICAL EXAM:  GEN: no acute distress  HEENT: moist mucous membranes   CHEST: clear to auscultation bilaterally   CVS: S1/S2 present, RRR  ABD: soft, nontender, nondistended, bowel sounds present  EXT: no edema  NEURO: AAOX3

## 2018-12-18 NOTE — DIETITIAN INITIAL EVALUATION ADULT. - PHYSICAL APPEARANCE
alert, oriented, OOB to chair, son at bedside. BMI- 23.4. IBW- 130lbs/59kg. Skin: intact. BMI x 1 yesterday (soft per pt).No chewing/swallowing difficulties reported./well nourished

## 2018-12-18 NOTE — PROGRESS NOTE ADULT - ASSESSMENT
77 yo F with PMHx of HTN who presents with substernal cp x 2 wks, relieved with rest, associated with exertional dyspnea. No hx of similar sx in past. Pt went to her PMD where EKG and blood work was done. She was told that the workup was abnormal and was referred to the ED. In the ED, pt fond to have biphasic T wave inversions in the anteroseptal leads concerning for Wellen's. Serial trops x4 were neg. Pt admitted to the CCU.     #Ischemic cardiomyopathy with severe 3 vessel disease  - asymptomatic, hemodynamically stable  - troponin negative x4  - echo 12/12: LVEF 30-35%  - cardiac MRI 12/17: nonviable apical walls/mid cavity anterior/anteroseptal walls/anterolateral walls, LV EF 26%   - per CTS: CABG vs PCI will be discussed at cath conference tomorrow 12/19  - c/w aspirin, lipitor, lisinopril, metoprolol  - plavix on hold for now     #HTN  - c/w lisinopril, metoprolol    #Chronic diarrhea  - c/w loperamide prn    #UTI  - s/p ceftriaxone (ended 12/16)    #DVT ppx  - c/w heparin 5000 units subQ Q8H     #GI ppx  - c/w protonix 40 mg PO daily    #Diet  - DASH/TLC    #Code  - full code  - son Rogerio      #Dispo  - c/w CCU

## 2018-12-18 NOTE — DIETITIAN INITIAL EVALUATION ADULT. - ENERGY NEEDS
Estimated energy needs: ~1480kcal/day (MSJ x 1.3)   Estimated protein needs: 64-80gm/d (1-1.25)  Estimated fluid needs: per CCU team

## 2018-12-18 NOTE — DIETITIAN INITIAL EVALUATION ADULT. - DIET TYPE
DASH/TLC (sodium and cholesterol restricted diet)/pt report good appetite; documented PO intake 60-75%

## 2018-12-19 LAB
AUTO DIFF PNL BLD: ABNORMAL
HCT VFR BLD CALC: 35.1 % — LOW (ref 37–47)
HGB BLD-MCNC: 11.4 G/DL — LOW (ref 12–16)
MCHC RBC-ENTMCNC: 28.6 PG — SIGNIFICANT CHANGE UP (ref 27–31)
MCHC RBC-ENTMCNC: 32.5 G/DL — SIGNIFICANT CHANGE UP (ref 32–37)
MCV RBC AUTO: 88 FL — SIGNIFICANT CHANGE UP (ref 81–99)
NRBC # BLD: 0 /100 WBCS — SIGNIFICANT CHANGE UP (ref 0–0)
PLATELET # BLD AUTO: 228 K/UL — SIGNIFICANT CHANGE UP (ref 130–400)
RBC # BLD: 3.99 M/UL — LOW (ref 4.2–5.4)
RBC # FLD: 13.8 % — SIGNIFICANT CHANGE UP (ref 11.5–14.5)
WBC # BLD: 8.13 K/UL — SIGNIFICANT CHANGE UP (ref 4.8–10.8)
WBC # FLD AUTO: 8.13 K/UL — SIGNIFICANT CHANGE UP (ref 4.8–10.8)

## 2018-12-19 RX ORDER — ZOLPIDEM TARTRATE 10 MG/1
5 TABLET ORAL ONCE
Qty: 0 | Refills: 0 | Status: DISCONTINUED | OUTPATIENT
Start: 2018-12-19 | End: 2018-12-19

## 2018-12-19 RX ORDER — LOPERAMIDE HCL 2 MG
2 TABLET ORAL
Qty: 0 | Refills: 0 | Status: DISCONTINUED | OUTPATIENT
Start: 2018-12-19 | End: 2018-12-22

## 2018-12-19 RX ADMIN — Medication 12.5 MILLIGRAM(S): at 18:15

## 2018-12-19 RX ADMIN — Medication 81 MILLIGRAM(S): at 11:59

## 2018-12-19 RX ADMIN — PANTOPRAZOLE SODIUM 40 MILLIGRAM(S): 20 TABLET, DELAYED RELEASE ORAL at 06:09

## 2018-12-19 RX ADMIN — ATORVASTATIN CALCIUM 80 MILLIGRAM(S): 80 TABLET, FILM COATED ORAL at 22:32

## 2018-12-19 RX ADMIN — HEPARIN SODIUM 5000 UNIT(S): 5000 INJECTION INTRAVENOUS; SUBCUTANEOUS at 13:33

## 2018-12-19 RX ADMIN — Medication 3 MILLIGRAM(S): at 22:32

## 2018-12-19 RX ADMIN — HEPARIN SODIUM 5000 UNIT(S): 5000 INJECTION INTRAVENOUS; SUBCUTANEOUS at 06:09

## 2018-12-19 RX ADMIN — HEPARIN SODIUM 5000 UNIT(S): 5000 INJECTION INTRAVENOUS; SUBCUTANEOUS at 22:32

## 2018-12-19 RX ADMIN — Medication 2 MILLIGRAM(S): at 18:16

## 2018-12-19 RX ADMIN — CHLORHEXIDINE GLUCONATE 1 APPLICATION(S): 213 SOLUTION TOPICAL at 06:09

## 2018-12-19 RX ADMIN — LISINOPRIL 2.5 MILLIGRAM(S): 2.5 TABLET ORAL at 06:09

## 2018-12-19 RX ADMIN — ZOLPIDEM TARTRATE 5 MILLIGRAM(S): 10 TABLET ORAL at 23:57

## 2018-12-19 NOTE — PROGRESS NOTE ADULT - ASSESSMENT
79 yo F with PMHx of HTN who presents with substernal cp x 2 wks, relieved with rest, associated with exertional dyspnea. No hx of similar sx in past. Pt went to her PMD where EKG and blood work was done. She was told that the workup was abnormal and was referred to the ED. In the ED, pt fond to have biphasic T wave inversions in the anteroseptal leads concerning for Wellen's. Serial trops x4 were neg. Pt admitted to the CCU.     #Ischemic cardiomyopathy with severe 3 vessel disease  - asymptomatic, hemodynamically stable  - troponin negative x4  - echo 12/12: LVEF 30-35%  - cardiac MRI 12/17: nonviable apical walls/mid cavity anterior/anteroseptal walls/anterolateral walls, LV EF 26%   - c/w aspirin, lipitor, lisinopril, metoprolol  - plavix on hold for now   - pt will go for high risk PCI tomorrow at 7am with Dr. Posada  - NPO after midnight    #HTN  - c/w lisinopril, metoprolol    #Chronic diarrhea  - c/w loperamide prn    #UTI  - s/p ceftriaxone (ended 12/16)    #DVT ppx  - c/w heparin 5000 units subQ Q8H     #GI ppx  - c/w protonix 40 mg PO daily    #Diet  - DASH/TLC  - NPO after midnight    #Code  - full code  - son Rogerio      #Dispo  - c/w CCU

## 2018-12-19 NOTE — PROGRESS NOTE ADULT - SUBJECTIVE AND OBJECTIVE BOX
Patient is a 78y old  Female who presents with a chief complaint of Chest pain (18 Dec 2018 13:48)    Patient was seen and examined.  C/o diarrhea  Denies chest pain, sob    PAST MEDICAL & SURGICAL HISTORY:  Hypertension  No significant past surgical history    Allergies  No Known Allergies    MEDICATIONS  (STANDING):  aspirin  chewable 81 milliGRAM(s) Oral daily  atorvastatin 80 milliGRAM(s) Oral at bedtime  chlorhexidine 4% Liquid 1 Application(s) Topical <User Schedule>  heparin  Injectable 5000 Unit(s) SubCutaneous every 8 hours  lisinopril 2.5 milliGRAM(s) Oral daily  loperamide 2 milliGRAM(s) Oral two times a day  melatonin 3 milliGRAM(s) Oral at bedtime  metoprolol tartrate 12.5 milliGRAM(s) Oral two times a day  pantoprazole    Tablet 40 milliGRAM(s) Oral before breakfast    Vital Signs Last 24 Hrs  T(C): 36.2  T(F): 97.2  HR: 73  BP: 103/52  BP(mean): 79  RR: 20  SpO2: 98%    O/E:  Awake, alert, not in distress.  HEENT: atraumatic, EOMI.  Chest: clear.  CVS: SIS2 +, no murmur.  P/A: Soft, BS+  CNS: non focal.  Ext: no edema feet.  Skin: no rash, no ulcers.  All systems reviewed positive findings as above.                            11.4<L>  8.13  )-----------( 228      ( 19 Dec 2018 04:14 )             35.1<L>                        11.8<L>  7.27  )-----------( 234      ( 18 Dec 2018 04:15 )             35.9<L>

## 2018-12-19 NOTE — PROGRESS NOTE ADULT - ASSESSMENT
79 yo F with PMH of HTN present with chest pain. Patient complaints of substernal chest pressure relieved with rest, for the past 2 and half weeks a/w extertional dyspnea. Patient never had similar symptoms in the past. As per patient, her were getting worse over the past few days to the point that she started getting chest pains even with minimal exertion and sometimes even at rest. So, patient went to her PMD where ECG and blood work were done (cardiac blood work; details unknown)  and was told it was abnormal. Patient was sent by her to the ED. Patient denied any other symptoms.   In ED, patient was found to have T wave bipahsic inversions anteroseptal leads. Currently, Patient is asymptomatic at rest. (11 Dec 2018 19:11)    #Ischemic cardiomyopathy with severe 3 vessel disease  - troponin negative   - echo 12/12: LVEF 30-35%  - cardiac MRI 12/17: nonviable apical walls/mid cavity anterior/anteroseptal walls/anterolateral walls, LV EF 26%   - per CTS: CABG vs PCI  to be discussed with patient and family  - c/w aspirin, lipitor, lisinopril, metoprolol  - plavix on hold for now     #HTN  - c/w lisinopril, metoprolol    #Chronic diarrhea  - c/w loperamide prn    #UTI  - s/p ceftriaxone (ended 12/16)    #DVT ppx  - c/w heparin 5000 units subQ Q8H     #GI ppx  - c/w protonix 40 mg PO daily    #Diet  - DASH/TLC    #Code  - full code  - son Rogerio

## 2018-12-19 NOTE — PROGRESS NOTE ADULT - SUBJECTIVE AND OBJECTIVE BOX
LENGTH OF HOSPITAL STAY: 8d    CHIEF COMPLAINT:   Patient is a 79 yo F who presents with a chief complaint of chest pain.    No events overnight.     HISTORY OF PRESENTING ILLNESS:   79 yo F with PMHx of HTN who presents with substernal cp x 2 wks, relieved with rest, associated with exertional dyspnea. No hx of similar sx in past. Pt went to her PMD where EKG and blood work was done. She was told that the workup was abnormal and was referred to the ED.     In the ED, pt found to have biphasic T wave inversions in the anteroseptal leads concerning for Wellen's. Serial trops x4 were neg. Pt admitted to the CCU. Asymptomatic at rest.    PAST MEDICAL & SURGICAL HISTORY  PAST MEDICAL & SURGICAL HISTORY:  Hypertension  No significant past surgical history    SOCIAL HISTORY:    ALLERGIES:  No Known Allergies    MEDICATIONS:  STANDING MEDICATIONS  aspirin  chewable 81 milliGRAM(s) Oral daily  atorvastatin 80 milliGRAM(s) Oral at bedtime  chlorhexidine 4% Liquid 1 Application(s) Topical <User Schedule>  heparin  Injectable 5000 Unit(s) SubCutaneous every 8 hours  lisinopril 2.5 milliGRAM(s) Oral daily  loperamide 2 milliGRAM(s) Oral two times a day  melatonin 3 milliGRAM(s) Oral at bedtime  metoprolol tartrate 12.5 milliGRAM(s) Oral two times a day  pantoprazole    Tablet 40 milliGRAM(s) Oral before breakfast    PRN MEDICATIONS    VITALS:   T(F): 97.4  HR: 66  BP: 102/53  RR: 20  SpO2: 98%    LABS:                        11.4   8.13  )-----------( 228      ( 19 Dec 2018 04:14 )             35.1                         RADIOLOGY:  - echo 12/12: LVEF 30-35%  - CT chest 12/14: unremarkable  - carotid duplex 12/14: 20-39% stenosis in bilat ICA  - cardiac MRI 12/17: nonviable apical walls/mid cavity anterior/anteroseptal walls/anterolateral walls, LV EF 26%    PHYSICAL EXAM:  GEN: no acute distress  HEENT: moist mucous membranes   LUNGS: clear to auscultation bilaterally   HEART: S1/S2 present, RRR  ABD: soft, nontender, nondistended, bowel sounds present  EXT: no edema  NEURO: AAOX3

## 2018-12-20 LAB
ANION GAP SERPL CALC-SCNC: 14 MMOL/L — SIGNIFICANT CHANGE UP (ref 7–14)
BUN SERPL-MCNC: 29 MG/DL — HIGH (ref 10–20)
CALCIUM SERPL-MCNC: 9.3 MG/DL — SIGNIFICANT CHANGE UP (ref 8.5–10.1)
CHLORIDE SERPL-SCNC: 108 MMOL/L — SIGNIFICANT CHANGE UP (ref 98–110)
CO2 SERPL-SCNC: 22 MMOL/L — SIGNIFICANT CHANGE UP (ref 17–32)
CREAT SERPL-MCNC: 1.2 MG/DL — SIGNIFICANT CHANGE UP (ref 0.7–1.5)
GLUCOSE SERPL-MCNC: 93 MG/DL — SIGNIFICANT CHANGE UP (ref 70–99)
HCT VFR BLD CALC: 35.2 % — LOW (ref 37–47)
HGB BLD-MCNC: 11.6 G/DL — LOW (ref 12–16)
MCHC RBC-ENTMCNC: 29 PG — SIGNIFICANT CHANGE UP (ref 27–31)
MCHC RBC-ENTMCNC: 33 G/DL — SIGNIFICANT CHANGE UP (ref 32–37)
MCV RBC AUTO: 88 FL — SIGNIFICANT CHANGE UP (ref 81–99)
NRBC # BLD: 0 /100 WBCS — SIGNIFICANT CHANGE UP (ref 0–0)
PLATELET # BLD AUTO: 214 K/UL — SIGNIFICANT CHANGE UP (ref 130–400)
POTASSIUM SERPL-MCNC: 4.3 MMOL/L — SIGNIFICANT CHANGE UP (ref 3.5–5)
POTASSIUM SERPL-SCNC: 4.3 MMOL/L — SIGNIFICANT CHANGE UP (ref 3.5–5)
RBC # BLD: 4 M/UL — LOW (ref 4.2–5.4)
RBC # FLD: 13.8 % — SIGNIFICANT CHANGE UP (ref 11.5–14.5)
SODIUM SERPL-SCNC: 144 MMOL/L — SIGNIFICANT CHANGE UP (ref 135–146)
WBC # BLD: 6.19 K/UL — SIGNIFICANT CHANGE UP (ref 4.8–10.8)
WBC # FLD AUTO: 6.19 K/UL — SIGNIFICANT CHANGE UP (ref 4.8–10.8)

## 2018-12-20 RX ORDER — SODIUM CHLORIDE 9 MG/ML
500 INJECTION INTRAMUSCULAR; INTRAVENOUS; SUBCUTANEOUS
Qty: 0 | Refills: 0 | Status: DISCONTINUED | OUTPATIENT
Start: 2018-12-20 | End: 2018-12-21

## 2018-12-20 RX ORDER — CLOPIDOGREL BISULFATE 75 MG/1
75 TABLET, FILM COATED ORAL DAILY
Qty: 0 | Refills: 0 | Status: DISCONTINUED | OUTPATIENT
Start: 2018-12-20 | End: 2018-12-22

## 2018-12-20 RX ORDER — ZOLPIDEM TARTRATE 10 MG/1
5 TABLET ORAL AT BEDTIME
Qty: 0 | Refills: 0 | Status: DISCONTINUED | OUTPATIENT
Start: 2018-12-20 | End: 2018-12-22

## 2018-12-20 RX ADMIN — CHLORHEXIDINE GLUCONATE 1 APPLICATION(S): 213 SOLUTION TOPICAL at 06:18

## 2018-12-20 RX ADMIN — PANTOPRAZOLE SODIUM 40 MILLIGRAM(S): 20 TABLET, DELAYED RELEASE ORAL at 06:18

## 2018-12-20 RX ADMIN — ZOLPIDEM TARTRATE 5 MILLIGRAM(S): 10 TABLET ORAL at 23:17

## 2018-12-20 RX ADMIN — LISINOPRIL 2.5 MILLIGRAM(S): 2.5 TABLET ORAL at 06:18

## 2018-12-20 RX ADMIN — Medication 3 MILLIGRAM(S): at 21:34

## 2018-12-20 RX ADMIN — Medication 2 MILLIGRAM(S): at 21:40

## 2018-12-20 RX ADMIN — HEPARIN SODIUM 5000 UNIT(S): 5000 INJECTION INTRAVENOUS; SUBCUTANEOUS at 21:34

## 2018-12-20 RX ADMIN — Medication 12.5 MILLIGRAM(S): at 06:17

## 2018-12-20 RX ADMIN — ATORVASTATIN CALCIUM 80 MILLIGRAM(S): 80 TABLET, FILM COATED ORAL at 21:34

## 2018-12-20 RX ADMIN — Medication 81 MILLIGRAM(S): at 13:57

## 2018-12-20 NOTE — PROGRESS NOTE ADULT - SUBJECTIVE AND OBJECTIVE BOX
LENGTH OF HOSPITAL STAY: 9d    CHIEF COMPLAINT:   Patient is a 77 yo F who presents with a chief complaint of chest pain.    Went for PCI to [facility] with DEJAN today.    HISTORY OF PRESENTING ILLNESS:   77 yo F with PMHx of HTN who presents with substernal cp x 2 wks, relieved with rest, associated with exertional dyspnea. No hx of similar sx in past. Pt went to her PMD where EKG and blood work was done. She was told that the workup was abnormal and was referred to the ED.     In the ED, pt found to have biphasic T wave inversions in the anteroseptal leads concerning for Wellen's. Serial trops x4 were neg. Pt admitted to the CCU. Asymptomatic at rest.    PAST MEDICAL & SURGICAL HISTORY  PAST MEDICAL & SURGICAL HISTORY:  Hypertension  No significant past surgical history    SOCIAL HISTORY:    ALLERGIES:  No Known Allergies    MEDICATIONS:  STANDING MEDICATIONS  aspirin  chewable 81 milliGRAM(s) Oral daily  atorvastatin 80 milliGRAM(s) Oral at bedtime  chlorhexidine 4% Liquid 1 Application(s) Topical <User Schedule>  clopidogrel Tablet 75 milliGRAM(s) Oral daily  heparin  Injectable 5000 Unit(s) SubCutaneous every 8 hours  lisinopril 2.5 milliGRAM(s) Oral daily  loperamide 2 milliGRAM(s) Oral two times a day  melatonin 3 milliGRAM(s) Oral at bedtime  metoprolol tartrate 12.5 milliGRAM(s) Oral two times a day  pantoprazole    Tablet 40 milliGRAM(s) Oral before breakfast  sodium chloride 0.9%. 500 milliLiter(s) IV Continuous <Continuous>    PRN MEDICATIONS    VITALS:   T(F): 96.7  HR: 60  BP: 111/56  RR: 48  SpO2: 98%    LABS:                        11.6   6.19  )-----------( 214      ( 20 Dec 2018 04:19 )             35.2     12-20    144  |  108  |  29<H>  ----------------------------<  93  4.3   |  22  |  1.2    Ca    9.3      20 Dec 2018 04:19    RADIOLOGY:  - echo 12/12: LVEF 30-35%  - CT chest 12/14: unremarkable  - carotid duplex 12/14: 20-39% stenosis in bilat ICA  - cardiac MRI 12/17: nonviable apical walls/mid cavity anterior/anteroseptal walls/anterolateral walls, LV EF 26%    PHYSICAL EXAM:  GEN: no acute distress  HEENT: moist mucous membranes   LUNGS: clear to auscultation bilaterally   HEART: S1/S2 present, RRR  ABD: soft, nontender, nondistended, bowel sounds present  EXT: no edema  NEURO: AAOX3 LENGTH OF HOSPITAL STAY: 9d    CHIEF COMPLAINT:   Patient is a 79 yo F who presents with a chief complaint of chest pain.    Went for PCI to [facility] with DEJAN today.    HISTORY OF PRESENTING ILLNESS:   79 yo F with PMHx of HTN who presents with substernal cp x 2 wks, relieved with rest, associated with exertional dyspnea. No hx of similar sx in past. Pt went to her PMD where EKG and blood work was done. She was told that the workup was abnormal and was referred to the ED.     In the ED, pt found to have biphasic T wave inversions in the anteroseptal leads concerning for Wellen's. Serial trops x4 were neg. Pt admitted to the CCU. Asymptomatic at rest.    PAST MEDICAL & SURGICAL HISTORY  PAST MEDICAL & SURGICAL HISTORY:  Hypertension  No significant past surgical history      ALLERGIES:  No Known Allergies    MEDICATIONS:  STANDING MEDICATIONS  aspirin  chewable 81 milliGRAM(s) Oral daily  atorvastatin 80 milliGRAM(s) Oral at bedtime  chlorhexidine 4% Liquid 1 Application(s) Topical <User Schedule>  clopidogrel Tablet 75 milliGRAM(s) Oral daily  heparin  Injectable 5000 Unit(s) SubCutaneous every 8 hours  lisinopril 2.5 milliGRAM(s) Oral daily  loperamide 2 milliGRAM(s) Oral two times a day  melatonin 3 milliGRAM(s) Oral at bedtime  metoprolol tartrate 12.5 milliGRAM(s) Oral two times a day  pantoprazole    Tablet 40 milliGRAM(s) Oral before breakfast  sodium chloride 0.9%. 500 milliLiter(s) IV Continuous <Continuous>    PRN MEDICATIONS    VITALS:   T(F): 96.7  HR: 60  BP: 111/56  RR: 48  SpO2: 98%    LABS:                        11.6   6.19  )-----------( 214      ( 20 Dec 2018 04:19 )             35.2     12-20    144  |  108  |  29<H>  ----------------------------<  93  4.3   |  22  |  1.2    Ca    9.3      20 Dec 2018 04:19    RADIOLOGY:  - echo 12/12: LVEF 30-35%  - CT chest 12/14: unremarkable  - carotid duplex 12/14: 20-39% stenosis in bilat ICA  - cardiac MRI 12/17: nonviable apical walls/mid cavity anterior/anteroseptal walls/anterolateral walls, LV EF 26%    PHYSICAL EXAM:  GEN: no acute distress  HEENT: moist mucous membranes   CHEST: clear to auscultation bilaterally   CVS: S1/S2 present, RRR  ABD: soft, nontender, nondistended, bowel sounds present  EXT: no edema  NEURO: AAOX3

## 2018-12-20 NOTE — CHART NOTE - NSCHARTNOTEFT_GEN_A_CORE
I have personally seen and examined the patient.  I agree with the history and physical which I have reviewed and noted any changes below.  12-20-18 @ 09:27    PRE-OP DIAGNOSIS: patient with triple vessel disease,  to LAD and RCA , significant disease to Lcx, larges vessel supplying RCA and LAD territories via collaterals, patient has been evaluated by CT Sx team and high rsik PCI vs CABG discussed in detail with patient and family , and planed for high risk PCI today with insertion of impella for support.     PROCEDURE: [facility] , impella insertion     Physician: Dr Posada  Assistant: Dr Taylor, Dr Boykin,     ANESTHESIA TYPE:  [  ]General Anesthesia  [  ] Sedation  [ x] Local/Regional    CONDITION  [  ] Critical  [  ] Serious  [  ]Fair  [x ]Good      IV CONTRAST:     150    mL      FINDINGS    Left Heart Catheterization:    LEFT HEART CATHERIZATION                                    Left main patent    LAD:   to prox LAD                         Left Circumflex: 75 % lesion       INTERVENTION  impella successfully inserted at the beginning of the case  C successful PCI to [facility] wit DEJAN  impella successfully removed by end of the case.              PLAN OF CARE  [ x] Return to In-patient bed in CCU  [x ]  Continue DAPT, B-blocker & Statin therapy

## 2018-12-20 NOTE — PROGRESS NOTE ADULT - SUBJECTIVE AND OBJECTIVE BOX
PREOPERATIVE DAY OF PROCEDURE EVALUATION:  I have personally seen and examined the patient.  I agree with the history and physical which I have reviewed and noted any changes below.  (Signed electronically by _Dr Posada_________)  12-20-18 @ 07:07

## 2018-12-20 NOTE — PROGRESS NOTE ADULT - ASSESSMENT
77 yo F with PMHx of HTN who presents with substernal cp x 2 wks, relieved with rest, associated with exertional dyspnea. No hx of similar sx in past. Pt went to her PMD where EKG and blood work was done. She was told that the workup was abnormal and was referred to the ED. In the ED, pt fond to have biphasic T wave inversions in the anteroseptal leads concerning for Wellen's. Serial trops x4 were neg. Pt admitted to the CCU.     #Ischemic cardiomyopathy with severe 3 vessel disease  - s/p PCI with DEJAN to LCX today  - asymptomatic, hemodynamically stable  - troponin negative x4  - echo 12/12: LVEF 30-35%  - cardiac MRI 12/17: nonviable apical walls/mid cavity anterior/anteroseptal walls/anterolateral walls, LV EF 26%   - c/w aspirin, lipitor, lisinopril, metoprolol  - plavix on hold for now     #HTN  - c/w lisinopril, metoprolol    #Chronic diarrhea  - electrolytes wnl  - c/w loperamide prn    #UTI  - s/p ceftriaxone (ended 12/16)    #DVT ppx  - c/w heparin 5000 units subQ Q8H     #GI ppx  - c/w protonix 40 mg PO daily    #Diet  - DASH/TLC    #Code  - full code  - son Rogerio      #Dispo  - c/w CCU 77 yo F with PMHx of HTN who presents with substernal cp x 2 wks, relieved with rest, associated with exertional dyspnea. No hx of similar sx in past. Pt went to her PMD where EKG and blood work was done. She was told that the workup was abnormal and was referred to the ED. In the ED, pt fond to have biphasic T wave inversions in the anteroseptal leads concerning for Wellen's. Serial trops x4 were neg. Pt admitted to the CCU.     #Ischemic cardiomyopathy with severe 3 vessel disease  - s/p PCI with DEJAN to LCX today  - asymptomatic, hemodynamically stable  - troponin negative x4  - echo 12/12: LVEF 30-35%  - cardiac MRI 12/17: nonviable apical walls/mid cavity anterior/anteroseptal walls/anterolateral walls, LV EF 26%   - c/w aspirin, lipitor, lisinopril, metoprolol  - restart plavix when pt gets back from PCI    #HTN  - c/w lisinopril, metoprolol    #Chronic diarrhea  - electrolytes wnl  - c/w loperamide prn    #UTI  - s/p ceftriaxone (ended 12/16)    #DVT ppx  - c/w heparin 5000 units subQ Q8H     #GI ppx  - c/w protonix 40 mg PO daily    #Diet  - DASH/TLC    #Code  - full code  - son Rogerio      #Dispo  - c/w CCU

## 2018-12-21 LAB
ANION GAP SERPL CALC-SCNC: 16 MMOL/L — HIGH (ref 7–14)
BASOPHILS # BLD AUTO: 0.03 K/UL — SIGNIFICANT CHANGE UP (ref 0–0.2)
BASOPHILS NFR BLD AUTO: 0.3 % — SIGNIFICANT CHANGE UP (ref 0–1)
BUN SERPL-MCNC: 31 MG/DL — HIGH (ref 10–20)
CALCIUM SERPL-MCNC: 8.9 MG/DL — SIGNIFICANT CHANGE UP (ref 8.5–10.1)
CHLORIDE SERPL-SCNC: 109 MMOL/L — SIGNIFICANT CHANGE UP (ref 98–110)
CO2 SERPL-SCNC: 18 MMOL/L — SIGNIFICANT CHANGE UP (ref 17–32)
CREAT SERPL-MCNC: 1.3 MG/DL — SIGNIFICANT CHANGE UP (ref 0.7–1.5)
EOSINOPHIL # BLD AUTO: 0.07 K/UL — SIGNIFICANT CHANGE UP (ref 0–0.7)
EOSINOPHIL NFR BLD AUTO: 0.8 % — SIGNIFICANT CHANGE UP (ref 0–8)
GLUCOSE SERPL-MCNC: 101 MG/DL — HIGH (ref 70–99)
HCT VFR BLD CALC: 33.4 % — LOW (ref 37–47)
HGB BLD-MCNC: 10.8 G/DL — LOW (ref 12–16)
IMM GRANULOCYTES NFR BLD AUTO: 0.1 % — SIGNIFICANT CHANGE UP (ref 0.1–0.3)
LYMPHOCYTES # BLD AUTO: 1.8 K/UL — SIGNIFICANT CHANGE UP (ref 1.2–3.4)
LYMPHOCYTES # BLD AUTO: 20 % — LOW (ref 20.5–51.1)
MCHC RBC-ENTMCNC: 28.6 PG — SIGNIFICANT CHANGE UP (ref 27–31)
MCHC RBC-ENTMCNC: 32.3 G/DL — SIGNIFICANT CHANGE UP (ref 32–37)
MCV RBC AUTO: 88.4 FL — SIGNIFICANT CHANGE UP (ref 81–99)
MONOCYTES # BLD AUTO: 0.69 K/UL — HIGH (ref 0.1–0.6)
MONOCYTES NFR BLD AUTO: 7.7 % — SIGNIFICANT CHANGE UP (ref 1.7–9.3)
NEUTROPHILS # BLD AUTO: 6.39 K/UL — SIGNIFICANT CHANGE UP (ref 1.4–6.5)
NEUTROPHILS NFR BLD AUTO: 71.1 % — SIGNIFICANT CHANGE UP (ref 42.2–75.2)
NRBC # BLD: 0 /100 WBCS — SIGNIFICANT CHANGE UP (ref 0–0)
PLATELET # BLD AUTO: 229 K/UL — SIGNIFICANT CHANGE UP (ref 130–400)
POTASSIUM SERPL-MCNC: 4.6 MMOL/L — SIGNIFICANT CHANGE UP (ref 3.5–5)
POTASSIUM SERPL-SCNC: 4.6 MMOL/L — SIGNIFICANT CHANGE UP (ref 3.5–5)
RBC # BLD: 3.78 M/UL — LOW (ref 4.2–5.4)
RBC # FLD: 14.1 % — SIGNIFICANT CHANGE UP (ref 11.5–14.5)
SODIUM SERPL-SCNC: 143 MMOL/L — SIGNIFICANT CHANGE UP (ref 135–146)
WBC # BLD: 8.99 K/UL — SIGNIFICANT CHANGE UP (ref 4.8–10.8)
WBC # FLD AUTO: 8.99 K/UL — SIGNIFICANT CHANGE UP (ref 4.8–10.8)

## 2018-12-21 PROCEDURE — 93010 ELECTROCARDIOGRAM REPORT: CPT

## 2018-12-21 RX ORDER — CLOPIDOGREL BISULFATE 75 MG/1
1 TABLET, FILM COATED ORAL
Qty: 0 | Refills: 0 | COMMUNITY
Start: 2018-12-21

## 2018-12-21 RX ORDER — ATORVASTATIN CALCIUM 80 MG/1
1 TABLET, FILM COATED ORAL
Qty: 0 | Refills: 0 | DISCHARGE
Start: 2018-12-21

## 2018-12-21 RX ORDER — METOPROLOL TARTRATE 50 MG
12.5 TABLET ORAL
Qty: 0 | Refills: 0 | COMMUNITY
Start: 2018-12-21

## 2018-12-21 RX ORDER — ASPIRIN/CALCIUM CARB/MAGNESIUM 324 MG
1 TABLET ORAL
Qty: 0 | Refills: 0 | DISCHARGE
Start: 2018-12-21

## 2018-12-21 RX ORDER — LISINOPRIL 2.5 MG/1
1 TABLET ORAL
Qty: 0 | Refills: 0 | COMMUNITY
Start: 2018-12-21

## 2018-12-21 RX ADMIN — HEPARIN SODIUM 5000 UNIT(S): 5000 INJECTION INTRAVENOUS; SUBCUTANEOUS at 06:03

## 2018-12-21 RX ADMIN — Medication 12.5 MILLIGRAM(S): at 19:09

## 2018-12-21 RX ADMIN — ZOLPIDEM TARTRATE 5 MILLIGRAM(S): 10 TABLET ORAL at 22:30

## 2018-12-21 RX ADMIN — Medication 2 MILLIGRAM(S): at 19:10

## 2018-12-21 RX ADMIN — ATORVASTATIN CALCIUM 80 MILLIGRAM(S): 80 TABLET, FILM COATED ORAL at 22:28

## 2018-12-21 RX ADMIN — HEPARIN SODIUM 5000 UNIT(S): 5000 INJECTION INTRAVENOUS; SUBCUTANEOUS at 22:28

## 2018-12-21 RX ADMIN — PANTOPRAZOLE SODIUM 40 MILLIGRAM(S): 20 TABLET, DELAYED RELEASE ORAL at 06:03

## 2018-12-21 RX ADMIN — CLOPIDOGREL BISULFATE 75 MILLIGRAM(S): 75 TABLET, FILM COATED ORAL at 11:14

## 2018-12-21 RX ADMIN — Medication 81 MILLIGRAM(S): at 11:14

## 2018-12-21 RX ADMIN — HEPARIN SODIUM 5000 UNIT(S): 5000 INJECTION INTRAVENOUS; SUBCUTANEOUS at 14:18

## 2018-12-21 NOTE — DISCHARGE NOTE ADULT - CARE PLAN
Principal Discharge DX:	CAD (coronary artery disease)  Goal:	Prevention  Assessment and plan of treatment:	Continue with aspirin, plavix, metoprolol, lisinopril, atorvastatin  Secondary Diagnosis:	Hypertension  Assessment and plan of treatment:	Continue with metoprolol, lisinopril  Secondary Diagnosis:	Heart failure  Assessment and plan of treatment:	Continue with metoprolol, lisinopril

## 2018-12-21 NOTE — PROGRESS NOTE ADULT - SUBJECTIVE AND OBJECTIVE BOX
LENGTH OF HOSPITAL STAY: 10d    CHIEF COMPLAINT:   Patient is a 77 yo F who presents with a chief complaint of chest pain.    No complaints overnight. Diarrhea improved. Minimal pain/swelling from bilateral groin sites.    HISTORY OF PRESENTING ILLNESS:   77 yo F with PMHx of HTN who presents with substernal cp x 2 wks, relieved with rest, associated with exertional dyspnea. No hx of similar sx in past. Pt went to her PMD where EKG and blood work was done. She was told that the workup was abnormal and was referred to the ED.     In the ED, pt found to have biphasic T wave inversions in the anteroseptal leads concerning for Wellen's. Serial trops x4 were neg. Pt admitted to the CCU. Asymptomatic at rest.    PAST MEDICAL & SURGICAL HISTORY  PAST MEDICAL & SURGICAL HISTORY:  Hypertension  No significant past surgical history    SOCIAL HISTORY:    ALLERGIES:  No Known Allergies    MEDICATIONS:  STANDING MEDICATIONS  aspirin  chewable 81 milliGRAM(s) Oral daily  atorvastatin 80 milliGRAM(s) Oral at bedtime  chlorhexidine 4% Liquid 1 Application(s) Topical <User Schedule>  clopidogrel Tablet 75 milliGRAM(s) Oral daily  heparin  Injectable 5000 Unit(s) SubCutaneous every 8 hours  lisinopril 2.5 milliGRAM(s) Oral daily  loperamide 2 milliGRAM(s) Oral two times a day  melatonin 3 milliGRAM(s) Oral at bedtime  metoprolol tartrate 12.5 milliGRAM(s) Oral two times a day  pantoprazole    Tablet 40 milliGRAM(s) Oral before breakfast  sodium chloride 0.9%. 500 milliLiter(s) IV Continuous <Continuous>  zolpidem 5 milliGRAM(s) Oral at bedtime    PRN MEDICATIONS    VITALS:   T(F): 96  HR: 54  BP: 99/44  RR: 34  SpO2: 99%    LABS:                        10.8   8.99  )-----------( 229      ( 21 Dec 2018 04:16 )             33.4     12-21    143  |  109  |  31<H>  ----------------------------<  101<H>  4.6   |  18  |  1.3    Ca    8.9      21 Dec 2018 04:16    RADIOLOGY:  - echo 12/12: LVEF 30-35%  - CT chest 12/14: unremarkable  - carotid duplex 12/14: 20-39% stenosis in bilat ICA  - cardiac MRI 12/17: nonviable apical walls/mid cavity anterior/anteroseptal walls/anterolateral walls, LV EF 26%    PHYSICAL EXAM:  GEN: no acute distress  HEENT: moist mucous membranes   LUNGS: clear to auscultation bilaterally   HEART: S1/S2 present, RRR  ABD: soft, nontender, nondistended, bowel sounds present  EXT: no edema  NEURO: AAOX3 LENGTH OF HOSPITAL STAY: 10d    CHIEF COMPLAINT:   Patient is a 77 yo F who presents with a chief complaint of chest pain.    No complaints overnight. Diarrhea improved. Minimal pain/swelling from bilateral groin sites.    HISTORY OF PRESENTING ILLNESS:   77 yo F with PMHx of HTN who presents with substernal cp x 2 wks, relieved with rest, associated with exertional dyspnea. No hx of similar sx in past. Pt went to her PMD where EKG and blood work was done. She was told that the workup was abnormal and was referred to the ED.     In the ED, pt found to have biphasic T wave inversions in the anteroseptal leads concerning for Wellen's. Serial trops x4 were neg. Pt admitted to the CCU. Asymptomatic at rest.    PAST MEDICAL & SURGICAL HISTORY  PAST MEDICAL & SURGICAL HISTORY:  Hypertension  No significant past surgical history    SOCIAL HISTORY:    ALLERGIES:  No Known Allergies    MEDICATIONS:  STANDING MEDICATIONS  aspirin  chewable 81 milliGRAM(s) Oral daily  atorvastatin 80 milliGRAM(s) Oral at bedtime  chlorhexidine 4% Liquid 1 Application(s) Topical <User Schedule>  clopidogrel Tablet 75 milliGRAM(s) Oral daily  heparin  Injectable 5000 Unit(s) SubCutaneous every 8 hours  lisinopril 2.5 milliGRAM(s) Oral daily  loperamide 2 milliGRAM(s) Oral two times a day  melatonin 3 milliGRAM(s) Oral at bedtime  metoprolol tartrate 12.5 milliGRAM(s) Oral two times a day  pantoprazole    Tablet 40 milliGRAM(s) Oral before breakfast  sodium chloride 0.9%. 500 milliLiter(s) IV Continuous <Continuous>  zolpidem 5 milliGRAM(s) Oral at bedtime    PRN MEDICATIONS    VITALS:   T(F): 96  HR: 54  BP: 99/44  RR: 34  SpO2: 99%    LABS:                        10.8   8.99  )-----------( 229      ( 21 Dec 2018 04:16 )             33.4     12-21    143  |  109  |  31<H>  ----------------------------<  101<H>  4.6   |  18  |  1.3    Ca    8.9      21 Dec 2018 04:16    RADIOLOGY:  - echo 12/12: LVEF 30-35%  - CT chest 12/14: unremarkable  - carotid duplex 12/14: 20-39% stenosis in bilat ICA  - cardiac MRI 12/17: nonviable apical walls/mid cavity anterior/anteroseptal walls/anterolateral walls, LV EF 26%    PHYSICAL EXAM:  GEN: no acute distress  HEENT: moist mucous membranes   LUNGS: clear to auscultation bilaterally   HEART: S1/S2 present, RRR  ABD: soft, nontender, nondistended, bowel sounds present  EXT: no edema, ecchymosis to bilateral groin areas with no pain or swelling  NEURO: AAOX3 LENGTH OF HOSPITAL STAY: 10d    CHIEF COMPLAINT:   Patient is a 77 yo F who presents with a chief complaint of chest pain.    No complaints overnight. Diarrhea improved. Minimal pain/swelling from bilateral groin sites.    HISTORY OF PRESENTING ILLNESS:   77 yo F with PMHx of HTN who presents with substernal cp x 2 wks, relieved with rest, associated with exertional dyspnea. No hx of similar sx in past. Pt went to her PMD where EKG and blood work was done. She was told that the workup was abnormal and was referred to the ED.     In the ED, pt found to have biphasic T wave inversions in the anteroseptal leads concerning for Wellen's. Serial trops x4 were neg. Pt admitted to the CCU. Asymptomatic at rest.    PAST MEDICAL & SURGICAL HISTORY  PAST MEDICAL & SURGICAL HISTORY:  Hypertension  No significant past surgical history    ALLERGIES:  No Known Allergies    MEDICATIONS:  STANDING MEDICATIONS  aspirin  chewable 81 milliGRAM(s) Oral daily  atorvastatin 80 milliGRAM(s) Oral at bedtime  chlorhexidine 4% Liquid 1 Application(s) Topical <User Schedule>  clopidogrel Tablet 75 milliGRAM(s) Oral daily  heparin  Injectable 5000 Unit(s) SubCutaneous every 8 hours  lisinopril 2.5 milliGRAM(s) Oral daily  loperamide 2 milliGRAM(s) Oral two times a day  melatonin 3 milliGRAM(s) Oral at bedtime  metoprolol tartrate 12.5 milliGRAM(s) Oral two times a day  pantoprazole    Tablet 40 milliGRAM(s) Oral before breakfast  sodium chloride 0.9%. 500 milliLiter(s) IV Continuous <Continuous>  zolpidem 5 milliGRAM(s) Oral at bedtime    PRN MEDICATIONS    VITALS:   T(F): 96  HR: 54  BP: 99/44  RR: 24  SpO2: 99%    LABS:                        10.8   8.99  )-----------( 229      ( 21 Dec 2018 04:16 )             33.4     12-21    143  |  109  |  31<H>  ----------------------------<  101<H>  4.6   |  18  |  1.3    Ca    8.9      21 Dec 2018 04:16    RADIOLOGY:  - echo 12/12: LVEF 30-35%  - CT chest 12/14: unremarkable  - carotid duplex 12/14: 20-39% stenosis in bilat ICA  - cardiac MRI 12/17: nonviable apical walls/mid cavity anterior/anteroseptal walls/anterolateral walls, LV EF 26%    PHYSICAL EXAM:  GEN: no acute distress  HEENT: moist mucous membranes   CHEST: clear to auscultation bilaterally   CVS: S1/S2 present, RRR  ABD: soft, nontender, nondistended, bowel sounds present  EXT: no edema, ecchymosis to bilateral groin areas with no pain or swelling  NEURO: AAOX3

## 2018-12-21 NOTE — PROGRESS NOTE ADULT - ASSESSMENT
79 yo F with PMHx of HTN who presents with substernal cp x 2 wks, relieved with rest, associated with exertional dyspnea. No hx of similar sx in past. Pt went to her PMD where EKG and blood work was done. She was told that the workup was abnormal and was referred to the ED. In the ED, pt fond to have biphasic T wave inversions in the anteroseptal leads concerning for Wellen's. Serial trops x4 were neg. Pt admitted to the CCU.     #Ischemic cardiomyopathy with severe 3 vessel disease  - s/p PCI with DEJAN to LCX 12/20  - asymptomatic, hemodynamically stable  - troponin negative x4  - echo 12/12: LVEF 30-35%  - cardiac MRI 12/17: nonviable apical walls/mid cavity anterior/anteroseptal walls/anterolateral walls, LV EF 26%   - c/w aspirin, lipitor, lisinopril, metoprolol, plavix    #HTN  - c/w lisinopril, metoprolol    #Chronic diarrhea  - electrolytes wnl  - c/w loperamide prn    #UTI  - s/p ceftriaxone (ended 12/16)    #DVT ppx  - c/w heparin 5000 units subQ Q8H     #GI ppx  - c/w protonix 40 mg PO daily    #Diet  - DASH/TLC    #Code  - full code  - son Rogerio      #Dispo  - per cardiology 77 yo F with PMHx of HTN who presents with substernal cp x 2 wks, relieved with rest, associated with exertional dyspnea. No hx of similar sx in past. Pt went to her PMD where EKG and blood work was done. She was told that the workup was abnormal and was referred to the ED. In the ED, pt fond to have biphasic T wave inversions in the anteroseptal leads concerning for Wellen's. Serial trops x4 were neg. Pt admitted to the CCU.     #Ischemic cardiomyopathy with severe 3 vessel disease  - s/p PCI with DEJAN to LCX 12/20  - asymptomatic, hemodynamically stable  - troponin negative x4  - echo 12/12: LVEF 30-35%  - cardiac MRI 12/17: nonviable apical walls/mid cavity anterior/anteroseptal walls/anterolateral walls, LV EF 26%   - c/w aspirin, lipitor, lisinopril, metoprolol, plavix  -As per cardiology - Pt will need life vest prior to discharge.    #HTN  - c/w lisinopril, metoprolol    #Chronic diarrhea  - electrolytes wnl  - c/w loperamide prn    #UTI  - s/p ceftriaxone (ended 12/16)    #DVT ppx  - c/w heparin 5000 units subQ Q8H     #GI ppx  - c/w protonix 40 mg PO daily    #Diet  - DASH/TLC    #Code  - full code  - son Rogerio      #Dispo  - per cardiology

## 2018-12-21 NOTE — DISCHARGE NOTE ADULT - CARE PROVIDER_API CALL
Basilio Posada), Cardiovascular Disease; Interventional Cardiology  [address] [place] [address]  Phone: [phone]  Fax: [phone]

## 2018-12-21 NOTE — DISCHARGE NOTE ADULT - PLAN OF CARE
Prevention Continue with aspirin, plavix, metoprolol, lisinopril, atorvastatin Continue with metoprolol, lisinopril

## 2018-12-21 NOTE — DISCHARGE NOTE ADULT - HOSPITAL COURSE
79 yo F with PMHx of HTN who presents with substernal cp x 2 wks, relieved with rest, associated with exertional dyspnea. No hx of similar sx in past. Pt went to her PMD where EKG and blood work was done. She was told that the workup was abnormal and was referred to the ED. In the ED, pt found to have biphasic T wave inversions in the anteroseptal leads concerning for Wellen's. Serial trops x4 were neg. Pt admitted to the CCU. Echo showed EF 30-35%. Cardiac MRI showed nonviable anteroseptal/apical walls and EF of 26%. CTS was consulted for CABG vs high risk PCI. Pt opted for PCI and had PCI placed in LCX on 12/20. Was consulted for lifevest. Of note pt also had UTI during admission and completed ceftriaxone for it. 79 yo F with PMHx of HTN who presents with substernal cp x 2 wks, relieved with rest, associated with exertional dyspnea. No hx of similar sx in past. Pt went to her PMD where EKG and blood work was done. She was told that the workup was abnormal and was referred to the ED. In the ED, pt found to have biphasic T wave inversions in the anteroseptal leads concerning for Wellen's. Serial trops x4 were neg. Pt admitted to the CCU. Echo showed EF 30-35%. Cardiac MRI showed nonviable anteroseptal/apical walls and EF of 26%. CTS was consulted for CABG vs high risk PCI. Pt opted for PCI and had PCI placed in LCX on 12/20. Pt had a life vest placed . Pt to F/u with cardiology in 1week. Of note pt also had UTI during admission and completed ceftriaxone for it.

## 2018-12-21 NOTE — DISCHARGE NOTE ADULT - MEDICATION SUMMARY - MEDICATIONS TO TAKE
I will START or STAY ON the medications listed below when I get home from the hospital:    aspirin 81 mg oral tablet, chewable  -- 1 tab(s) by mouth once a day  -- Indication: For CAD (coronary artery disease)    lisinopril 2.5 mg oral tablet  -- 1 tab(s) by mouth once a day  -- Indication: For CAD (coronary artery disease)    atorvastatin 80 mg oral tablet  -- 1 tab(s) by mouth once a day (at bedtime)  -- Indication: For CAD (coronary artery disease)    clopidogrel 75 mg oral tablet  -- 1 tab(s) by mouth once a day  -- Indication: For CAD (coronary artery disease)    metoprolol tartrate 25 mg oral tablet  -- 12.5 tab(s) by mouth 2 times a day   -- It is very important that you take or use this exactly as directed.  Do not skip doses or discontinue unless directed by your doctor.  May cause drowsiness.  Alcohol may intensify this effect.  Use care when operating dangerous machinery.  Some non-prescription drugs may aggravate your condition.  Read all labels carefully.  If a warning appears, check with your doctor before taking.  Take with food or milk.  This drug may impair the ability to drive or operate machinery.  Use care until you become familiar with its effects.    -- Indication: For CAD (coronary artery disease)

## 2018-12-21 NOTE — CHART NOTE - NSCHARTNOTEFT_GEN_A_CORE
79 yo F with PMHx of HTN who presents with substernal cp x 2 wks, relieved with rest, associated with exertional dyspnea. No hx of similar sx in past. Pt went to her PMD where EKG and blood work was done. She was told that the workup was abnormal and was referred to the ED. In the ED, pt fond to have biphasic T wave inversions in the anteroseptal leads concerning for Wellen's. Serial trops x4 were neg. Pt admitted to the CCU.     #Ischemic cardiomyopathy with severe 3 vessel disease  - s/p PCI with DEJAN to LCX 12/20  - asymptomatic, hemodynamically stable  - troponin negative x4  - echo 12/12: LVEF 30-35%  - cardiac MRI 12/17: nonviable apical walls/mid cavity anterior/anteroseptal walls/anterolateral walls, LV EF 26%   - c/w aspirin, lipitor, lisinopril, metoprolol, plavix  - will need life Davis patel on board    #HTN  - c/w lisinopril, metoprolol    #Chronic diarrhea  - electrolytes wnl  - c/w loperamide prn    #UTI  - s/p ceftriaxone (ended 12/16)    #DVT ppx  - c/w heparin 5000 units subQ Q8H     #GI ppx  - c/w protonix 40 mg PO daily    #Diet  - DASH/TLC    #Code  - full code  - son Rogerio      #Dispo  - downgrade to telemetry for tonight  - discharge tomorrow 79 yo F with PMHx of HTN who presents with substernal cp x 2 wks, relieved with rest, associated with exertional dyspnea. No hx of similar sx in past. Pt went to her PMD where EKG and blood work was done. She was told that the workup was abnormal and was referred to the ED. In the ED, pt found to have biphasic T wave inversions in the anteroseptal leads concerning for Wellen's. Serial trops x4 were neg. Pt admitted to the CCU. Echo showed EF 30-35%. Cardiac MRI showed nonviable anteroseptal/apical walls and EF of 26%. CTS was consulted for CABG vs high risk PCI. Pt opted for PCI and had PCI placed in LCX on 12/20. Was consulted for lifevest. Of note pt also had UTI during admission and completed ceftriaxone for it.     #Ischemic cardiomyopathy with severe 3 vessel disease  - s/p PCI with DEJAN to LCX 12/20  - asymptomatic, hemodynamically stable  - troponin negative x4  - echo 12/12: LVEF 30-35%  - cardiac MRI 12/17: nonviable apical walls/mid cavity anterior/anteroseptal walls/anterolateral walls, LV EF 26%   - c/w aspirin, lipitor, lisinopril, metoprolol, plavix  - will need Davis acharya on board    #HTN  - c/w lisinopril, metoprolol    #Chronic diarrhea  - electrolytes wnl  - c/w loperamide prn    #UTI  - s/p ceftriaxone (ended 12/16)    #DVT ppx  - c/w heparin 5000 units subQ Q8H     #GI ppx  - c/w protonix 40 mg PO daily    #Diet  - DASH/TLC    #Code  - full code  - son Rogerio      #Dispo  - downgrade to telemetry for tonight  - discharge tomorrow

## 2018-12-21 NOTE — PROGRESS NOTE ADULT - SUBJECTIVE AND OBJECTIVE BOX
Cardiology Follow up    JAMIE SWARTZ   78yFemale  PAST MEDICAL & SURGICAL HISTORY:  Hypertension  No significant past surgical history    Allergies    No Known Allergies    Intolerances        Patient without complaints. Pt ambulated without issues/symptoms  Denies CP, SOB, palpitations, or dizziness  No events on telemetry overnight    Vital Signs Last 24 Hrs  T(C): 35.7 (21 Dec 2018 08:00), Max: 36.2 (20 Dec 2018 12:20)  T(F): 96.3 (21 Dec 2018 08:00), Max: 97.2 (20 Dec 2018 20:00)  HR: 62 (21 Dec 2018 10:00) (50 - 68)  BP: 92/46 (21 Dec 2018 10:00) (81/41 - 99/44)  BP(mean): 57 (21 Dec 2018 10:00) (54 - 77)  RR: 24 (21 Dec 2018 10:00) (16 - 34)  SpO2: 100% (21 Dec 2018 10:00) (97% - 100%)Allergies    REVIEW OF SYSTEMS:    CONSTITUTIONAL: No weakness, fevers or chills  EYES/ENT: No visual changes;  No vertigo or throat pain   NECK: No pain or stiffness  RESPIRATORY: No cough, wheezing, hemoptysis; No shortness of breath  CARDIOVASCULAR: No chest pain or palpitations  GASTROINTESTINAL: No abdominal or epigastric pain. No nausea, vomiting, or hematemesis; No diarrhea or constipation. No melena or hematochezia.  GENITOURINARY: No dysuria, frequency or hematuria  NEUROLOGICAL: No numbness or weakness  SKIN: No itching, rashes        NAD, appears well  S1S2, no murmurs, no JVD  CTA B/L, no wheeze, no rales  SNT +BS  Ext:    Right and Left Groin:  NO hematoma or bleeding,     NO bruit, ; C/D/I, + pulses   	    Pulses:  +Rad/ +PTs /+DPs/ same as baseline  A&Ox 3    EKG       Ventricular Rate 57 BPM    Atrial Rate 57 BPM    P-R Interval 172 ms    QRS Duration 96 ms    Q-T Interval 464 ms    QTC Calculation(Bezet) 451 ms    P Axis 70 degrees    R Axis 32 degrees    T Axis 112 degrees    Diagnosis Line Sinus bradycardia  Anteroseptal infarct , age undetermined  T wave abnormality, consider lateral ischemia  Abnormal ECG    Confirmed by ROMEO RODRIGUEZ MD ([id number]) on 12/21/2018 8:41:53 AM                                                                                                              2D ECHO    EXAM:  2-D ECHO (TTE) COMPLETE        PROCEDURE DATE:  12/12/2018      INTERPRETATION:  REPORT:    TRANSTHORACIC ECHOCARDIOGRAM REPORT         Patient Name:   JAMIE SWARTZ Accession #: [id number]  Medical Rec #:  [id number]         Height:      65.4 in 166.0 cm  YOB: 1940         Weight:      147.0 lb 66.68 kg  Patient Age:    78 years          BSA:         1.74 m²  Patient Gender: F                 BP:          114/56 mmHg       Date of Exam:        12/12/2018 10:51:07 AM  Referring Physician: [id number] ED UNASSIGNED  Sonographer:         Jamie Dudley  Reading Physician:   Brooke Aquino M.D.    Procedure:     2D Echo/Doppler/Color Doppler Complete.  Indications:   R94.31 - Abnormal Electrocardiogram [ECG] [EKG]  Diagnosis:     I42.9 - Cardiomyopathy, unspecified  Study Details: Technically good study.         Summary:   1. Left ventricular ejection fraction, by visual estimation, is 30 to   35%.   2. Multiple left ventricular regional wall motion abnormalities exist.   See wall motion findings.   3. Spectral Doppler shows impaired relaxation pattern of left   ventricular myocardial filling (Grade I diastolic dysfunction).   4. Mild mitral valve regurgitation.   5. Mild tricuspid regurgitation.    PHYSICIAN INTERPRETATION:  Left Ventricle: The left ventricular internal cavity size is normal. Left   ventricular wall thickness is normal. There is no left ventricular   hypertrophy. Left ventricular ejection fraction, by visual estimation, is   30 to 35%. Spectral Doppler shows impaired relaxation pattern of left   ventricular myocardial filling (Grade I diastolic dysfunction).       LV Wall Scoring:  The entire anterior septum, mid and apical inferior septum, apical   anterior  segment, apical inferior segment, and apex are hypokinetic. All remaining  scored segments are normal.    Right Ventricle: Normal right ventricular size and function.  Left Atrium: Normal left atrial size.  Right Atrium: Normal right atrial size.  Pericardium: There is no evidence of pericardial effusion.  Mitral Valve: Mild mitral valve regurgitation is seen. No evidence of   mitral stenosis.  Tricuspid Valve: Structurally normal tricuspid valve, with normal leaflet   excursion. Mild tricuspid regurgitation is visualized.  Aortic Valve: No evidence of aortic valve regurgitation is seen. No   evidence of aortic stenosis.  Pulmonic Valve: Structurally normal pulmonic valve, with normal leaflet   excursion. No indication of pulmonic valve regurgitation.  Aorta: The aortic root and ascending aorta are structurally normal, with   no evidence of dilitation.  Pulmonary Artery: The main pulmonary artery is normal in size.       2D AND M-MODE MEASUREMENTS (normal ranges within parentheses):  Left                 Normal    Aorta/Left           Normal  Ventricle:                     Atrium:  IVSd (2D):  1.01 cm  (0.7-1.1) AoV Cusp      2.01   (1.5-2.6)  LVPWd (2D): 0.68 cm  (0.7-1.1) Separation:   cm  LVIDd (2D): 4.00 cm  (3.4-5.7) Left Atrium   3.51   (1.9-4.0)  LVIDs (2D): 3.09 cm            (Mmode):      cm  LV FS (2D): 22.8 %   (>25%)    Right  IVSd        1.38 cm  (0.7-1.1) Ventricle:  (Mmode):                       RVd (2D):      2.53 cm  LVPWd       1.21 cm  (0.7-1.1)  (Mmode):  LVIDd       4.73 cm  (3.4-5.7)  (Mmode):  LVIDs       3.10 cm  (Mmode):  LV FS       34.5 %   (>25%)  (Mmode):  Relative    0.34     (<0.42)  Wall  Thickness  Rel. Wall   0.51     (<0.42)  Thickness  Mm  LV Mass     137.6  Index:      g/m²  Mmode    SPECTRAL DOPPLER ANALYSIS:  LV DIASTOLIC FUNCTION:  MV Peak E: 0.65 m/s Decel Time: 242 msec  MV Peak A: 0.85 m/s  E/A Ratio: 0.76    Aortic Valve:  AoV VMax:    1.29 m/s AoV Area, Vmax: 2.15 cm² Vmax Indx: 1.24 cm²/m²  AoV Pk Grad: 6.6 mmHg    LVOT Vmax: 0.90 m/s  LVOT VTI:  0.21 m  LVOT Diam: 1.98 cm    Mitral Valve:  MV P1/2 Time: 70.31 msec  MV Area, PHT: 3.13 cm²    Tricuspid Valve and PA/RV Systolic Pressure: TR Max Velocity: 2.13 m/s RA   Pressure: 8 mmHg RVSP/PASP: 26.2 mmHg       [id number] Brooke Aquino M.D., Electronically signed on 12/12/2018 at 4:07:08   PM              *** Final ***                    BROOKE AQUINO MD  This document has been electronically signed. Dec 12 2018 10:51AM            · Note Type	Event Note  post cath note	      I have personally seen and examined the patient.  I agree with the history and physical which I have reviewed and noted any changes below.  12-20-18 @ 09:27    PRE-OP DIAGNOSIS: patient with triple vessel disease,  to LAD and RCA , significant disease to Lcx, larges vessel supplying RCA and LAD territories via collaterals, patient has been evaluated by CT Sx team and high rsik PCI vs CABG discussed in detail with patient and family , and planed for high risk PCI today with insertion of impella for support.     PROCEDURE: [facility] , impella insertion     Physician: Dr Posada  Assistant: Dr Lucretia Woodall,     ANESTHESIA TYPE:  [  ]General Anesthesia  [  ] Sedation  [ x] Local/Regional    CONDITION  [  ] Critical  [  ] Serious  [  ]Fair  [x ]Good      IV CONTRAST:     150    mL      FINDINGS    Left Heart Catheterization:    LEFT HEART CATHERIZATION                                    Left main patent    LAD:   to prox LAD                         Left Circumflex: 75 % lesion       INTERVENTION  impella successfully inserted at the beginning of the case  C successful PCI to LCx wit DEJAN  impella successfully removed by end of the case.              PLAN OF CARE  [ x] Return to In-patient bed in CCU  [x ]  Continue DAPT, B-blocker & Statin therapy.      Electronic Signatures:  Luis Taylor)  (Signed 20-Dec-2018 09:40)  	Authored: Note Type, Note  Basilio Posada)  (Signed 20-Dec-2018 12:42)  	Co-Signer: Note Type      Last Updated: 20-Dec-2018 12:42 by Basilio Posada)        LABS                        10.8   8.99  )-----------( 229      ( 21 Dec 2018 04:16 )             33.4     12-21    143  |  109  |  31<H>  ----------------------------<  101<H>  4.6   |  18  |  1.3    Ca    8.9      21 Dec 2018 04:16              A/P:  I discussed the case with Interventional Cardiologist Dr. Posada & recommends the following:    S/P PCI LCX with Impella support  	         Continue DAPT(asa 81mg daily, plavix 75 mg daily,  B-Blocker, Statin Therapy, ace                    30 day supply of DAPT to be given by RN for home use only                   oob-ch, ambulate with assistance                    pt will need lifevest prior to D/C,                   transfer to [facility] per Dr. Posada, anticipate d/c home tomorrow and when lifevest in place([facility] aware of need for vest)                   Pt given instructions on importance of taking antiplatelet medication or risk acute stent thrombosis/death                   Post cath instructions, access site care and activity restrictions reviewed with patient                     Discussed with patient to return to hospital if experience chest pain, shortness breath, dizziness and site bleeding                   Aggressive risk factor modification, diet counseling, smoking cessation discussed with patient                        Follow up with Cardiology Dr. Posada in one week.  Instructed to call and make an appointment

## 2018-12-21 NOTE — DISCHARGE NOTE ADULT - PATIENT PORTAL LINK FT
You can access the [facility], offered by [facility], by registering with the following website: [email or web address]

## 2018-12-22 VITALS — WEIGHT: 147.05 LBS | HEIGHT: 64 IN

## 2018-12-22 LAB
ANION GAP SERPL CALC-SCNC: 18 MMOL/L — HIGH (ref 7–14)
BUN SERPL-MCNC: 22 MG/DL — HIGH (ref 10–20)
CALCIUM SERPL-MCNC: 9.4 MG/DL — SIGNIFICANT CHANGE UP (ref 8.5–10.1)
CHLORIDE SERPL-SCNC: 101 MMOL/L — SIGNIFICANT CHANGE UP (ref 98–110)
CO2 SERPL-SCNC: 21 MMOL/L — SIGNIFICANT CHANGE UP (ref 17–32)
CREAT SERPL-MCNC: 1.2 MG/DL — SIGNIFICANT CHANGE UP (ref 0.7–1.5)
GLUCOSE SERPL-MCNC: 92 MG/DL — SIGNIFICANT CHANGE UP (ref 70–99)
HCT VFR BLD CALC: 33.9 % — LOW (ref 37–47)
HGB BLD-MCNC: 11.1 G/DL — LOW (ref 12–16)
MCHC RBC-ENTMCNC: 28.8 PG — SIGNIFICANT CHANGE UP (ref 27–31)
MCHC RBC-ENTMCNC: 32.7 G/DL — SIGNIFICANT CHANGE UP (ref 32–37)
MCV RBC AUTO: 88.1 FL — SIGNIFICANT CHANGE UP (ref 81–99)
NRBC # BLD: 0 /100 WBCS — SIGNIFICANT CHANGE UP (ref 0–0)
PLATELET # BLD AUTO: 250 K/UL — SIGNIFICANT CHANGE UP (ref 130–400)
POTASSIUM SERPL-MCNC: 4.2 MMOL/L — SIGNIFICANT CHANGE UP (ref 3.5–5)
POTASSIUM SERPL-SCNC: 4.2 MMOL/L — SIGNIFICANT CHANGE UP (ref 3.5–5)
RBC # BLD: 3.85 M/UL — LOW (ref 4.2–5.4)
RBC # FLD: 14 % — SIGNIFICANT CHANGE UP (ref 11.5–14.5)
SODIUM SERPL-SCNC: 140 MMOL/L — SIGNIFICANT CHANGE UP (ref 135–146)
WBC # BLD: 6.73 K/UL — SIGNIFICANT CHANGE UP (ref 4.8–10.8)
WBC # FLD AUTO: 6.73 K/UL — SIGNIFICANT CHANGE UP (ref 4.8–10.8)

## 2018-12-22 PROCEDURE — 93010 ELECTROCARDIOGRAM REPORT: CPT

## 2018-12-22 RX ORDER — ATORVASTATIN CALCIUM 80 MG/1
1 TABLET, FILM COATED ORAL
Qty: 30 | Refills: 0
Start: 2018-12-22 | End: 2019-01-20

## 2018-12-22 RX ORDER — ASPIRIN/CALCIUM CARB/MAGNESIUM 324 MG
1 TABLET ORAL
Qty: 30 | Refills: 0
Start: 2018-12-22 | End: 2019-01-20

## 2018-12-22 RX ORDER — CLOPIDOGREL BISULFATE 75 MG/1
1 TABLET, FILM COATED ORAL
Qty: 30 | Refills: 0 | OUTPATIENT
Start: 2018-12-22

## 2018-12-22 RX ORDER — LISINOPRIL 2.5 MG/1
1 TABLET ORAL
Qty: 30 | Refills: 0
Start: 2018-12-22

## 2018-12-22 RX ORDER — LISINOPRIL 2.5 MG/1
1 TABLET ORAL
Qty: 30 | Refills: 0 | OUTPATIENT
Start: 2018-12-22

## 2018-12-22 RX ORDER — CLOPIDOGREL BISULFATE 75 MG/1
1 TABLET, FILM COATED ORAL
Qty: 30 | Refills: 0
Start: 2018-12-22

## 2018-12-22 RX ORDER — METOPROLOL TARTRATE 50 MG
12.5 TABLET ORAL
Qty: 30 | Refills: 0
Start: 2018-12-22

## 2018-12-22 RX ORDER — METOPROLOL TARTRATE 50 MG
12.5 TABLET ORAL
Qty: 30 | Refills: 0 | OUTPATIENT
Start: 2018-12-22

## 2018-12-22 RX ORDER — METOPROLOL TARTRATE 50 MG
12.5 TABLET ORAL
Qty: 60 | Refills: 0 | OUTPATIENT
Start: 2018-12-22

## 2018-12-22 RX ADMIN — HEPARIN SODIUM 5000 UNIT(S): 5000 INJECTION INTRAVENOUS; SUBCUTANEOUS at 06:25

## 2018-12-22 RX ADMIN — Medication 81 MILLIGRAM(S): at 11:19

## 2018-12-22 RX ADMIN — LISINOPRIL 2.5 MILLIGRAM(S): 2.5 TABLET ORAL at 06:25

## 2018-12-22 RX ADMIN — CLOPIDOGREL BISULFATE 75 MILLIGRAM(S): 75 TABLET, FILM COATED ORAL at 11:19

## 2018-12-22 RX ADMIN — Medication 12.5 MILLIGRAM(S): at 06:25

## 2018-12-22 RX ADMIN — Medication 2 MILLIGRAM(S): at 06:25

## 2018-12-22 RX ADMIN — PANTOPRAZOLE SODIUM 40 MILLIGRAM(S): 20 TABLET, DELAYED RELEASE ORAL at 06:26

## 2018-12-22 NOTE — PROGRESS NOTE ADULT - SUBJECTIVE AND OBJECTIVE BOX
Progress Note:   · Provider Specialty	Cardiology	    Reason for Admission:    Reason for Admission:  · Reason for Admission	Chest pain	      · Subjective and Objective: 	  Cardiology Follow up    JAMIE SWARTZ   78yFemale  PAST MEDICAL & SURGICAL HISTORY:  Hypertension  No significant past surgical history    Allergies    No Known Allergies    Intolerances        Patient without complaints. Pt ambulated without issues/symptoms  Denies CP, SOB, palpitations, or dizziness  s. nabor  on telemetry overnight      REVIEW OF SYSTEMS:    CONSTITUTIONAL: No weakness, fevers or chills  EYES/ENT: No visual changes;  No vertigo or throat pain   NECK: No pain or stiffness  RESPIRATORY: No cough, wheezing, hemoptysis; No shortness of breath  CARDIOVASCULAR: No chest pain or palpitations  GASTROINTESTINAL: No abdominal or epigastric pain. No nausea, vomiting, or hematemesis; No diarrhea or constipation. No melena or hematochezia.  GENITOURINARY: No dysuria, frequency or hematuria  NEUROLOGICAL: No numbness or weakness  SKIN: No itching, rashes        NAD, appears well  S1S2, no murmurs, no JVD  CTA B/L, no wheeze, no rales  SNT +BS  Ext:    Right and Left Groin:  NO hematoma or bleeding,     NO bruit, ; C/D/I, + pulses   	    Pulses:  +Rad/ +PTs /+DPs/ same as baseline  A&Ox 3    EKG       Ventricular Rate 57 BPM    Atrial Rate 57 BPM    P-R Interval 172 ms    QRS Duration 96 ms    Q-T Interval 464 ms    QTC Calculation(Bezet) 451 ms    P Axis 70 degrees    R Axis 32 degrees    T Axis 112 degrees    Diagnosis Line Sinus bradycardia  Anteroseptal infarct , age undetermined  T wave abnormality, consider lateral ischemia  Abnormal ECG    Confirmed by ROMEO RODRIGUEZ MD ([id number]) on 12/21/2018 8:41:53 AM                                                                                                              2D ECHO    EXAM:  2-D ECHO (TTE) COMPLETE        PROCEDURE DATE:  12/12/2018      INTERPRETATION:  REPORT:    TRANSTHORACIC ECHOCARDIOGRAM REPORT         Patient Name:   JAMIE SWARTZ Accession #: [id number]  Medical Rec #:  [id number]         Height:      65.4 in 166.0 cm  YOB: 1940         Weight:      147.0 lb 66.68 kg  Patient Age:    78 years          BSA:         1.74 m²  Patient Gender: F                 BP:          114/56 mmHg       Date of Exam:        12/12/2018 10:51:07 AM  Referring Physician: [id number] ED UNASSIGNED  Sonographer:         Jamie Dudley  Reading Physician:   Brooke Aquino M.D.    Procedure:     2D Echo/Doppler/Color Doppler Complete.  Indications:   R94.31 - Abnormal Electrocardiogram [ECG] [EKG]  Diagnosis:     I42.9 - Cardiomyopathy, unspecified  Study Details: Technically good study.         Summary:   1. Left ventricular ejection fraction, by visual estimation, is 30 to   35%.   2. Multiple left ventricular regional wall motion abnormalities exist.   See wall motion findings.   3. Spectral Doppler shows impaired relaxation pattern of left   ventricular myocardial filling (Grade I diastolic dysfunction).   4. Mild mitral valve regurgitation.   5. Mild tricuspid regurgitation.    PHYSICIAN INTERPRETATION:  Left Ventricle: The left ventricular internal cavity size is normal. Left   ventricular wall thickness is normal. There is no left ventricular   hypertrophy. Left ventricular ejection fraction, by visual estimation, is   30 to 35%. Spectral Doppler shows impaired relaxation pattern of left   ventricular myocardial filling (Grade I diastolic dysfunction).       LV Wall Scoring:  The entire anterior septum, mid and apical inferior septum, apical   anterior  segment, apical inferior segment, and apex are hypokinetic. All remaining  scored segments are normal.    Right Ventricle: Normal right ventricular size and function.  Left Atrium: Normal left atrial size.  Right Atrium: Normal right atrial size.  Pericardium: There is no evidence of pericardial effusion.  Mitral Valve: Mild mitral valve regurgitation is seen. No evidence of   mitral stenosis.  Tricuspid Valve: Structurally normal tricuspid valve, with normal leaflet   excursion. Mild tricuspid regurgitation is visualized.  Aortic Valve: No evidence of aortic valve regurgitation is seen. No   evidence of aortic stenosis.  Pulmonic Valve: Structurally normal pulmonic valve, with normal leaflet   excursion. No indication of pulmonic valve regurgitation.  Aorta: The aortic root and ascending aorta are structurally normal, with   no evidence of dilitation.  Pulmonary Artery: The main pulmonary artery is normal in size.       2D AND M-MODE MEASUREMENTS (normal ranges within parentheses):  Left                 Normal    Aorta/Left           Normal  Ventricle:                     Atrium:  IVSd (2D):  1.01 cm  (0.7-1.1) AoV Cusp      2.01   (1.5-2.6)  LVPWd (2D): 0.68 cm  (0.7-1.1) Separation:   cm  LVIDd (2D): 4.00 cm  (3.4-5.7) Left Atrium   3.51   (1.9-4.0)  LVIDs (2D): 3.09 cm            (Mmode):      cm  LV FS (2D): 22.8 %   (>25%)    Right  IVSd        1.38 cm  (0.7-1.1) Ventricle:  (Mmode):                       RVd (2D):      2.53 cm  LVPWd       1.21 cm  (0.7-1.1)  (Mmode):  LVIDd       4.73 cm  (3.4-5.7)  (Mmode):  LVIDs       3.10 cm  (Mmode):  LV FS       34.5 %   (>25%)  (Mmode):  Relative    0.34     (<0.42)  Wall  Thickness  Rel. Wall   0.51     (<0.42)  Thickness  Mm  LV Mass     137.6  Index:      g/m²  Mmode    SPECTRAL DOPPLER ANALYSIS:  LV DIASTOLIC FUNCTION:  MV Peak E: 0.65 m/s Decel Time: 242 msec  MV Peak A: 0.85 m/s  E/A Ratio: 0.76    Aortic Valve:  AoV VMax:    1.29 m/s AoV Area, Vmax: 2.15 cm² Vmax Indx: 1.24 cm²/m²  AoV Pk Grad: 6.6 mmHg    LVOT Vmax: 0.90 m/s  LVOT VTI:  0.21 m  LVOT Diam: 1.98 cm    Mitral Valve:  MV P1/2 Time: 70.31 msec  MV Area, PHT: 3.13 cm²    Tricuspid Valve and PA/RV Systolic Pressure: TR Max Velocity: 2.13 m/s RA   Pressure: 8 mmHg RVSP/PASP: 26.2 mmHg       [id number] Brooke Aquino M.D., Electronically signed on 12/12/2018 at 4:07:08   PM              *** Final ***                    BROOKE AQUINO MD  This document has been electronically signed. Dec 12 2018 10:51AM            · Note Type	Event Note  post cath note	      I have personally seen and examined the patient.  I agree with the history and physical which I have reviewed and noted any changes below.  12-20-18 @ 09:27    PRE-OP DIAGNOSIS: patient with triple vessel disease,  to LAD and RCA , significant disease to Lcx, larges vessel supplying RCA and LAD territories via collaterals, patient has been evaluated by CT Sx team and high rsik PCI vs CABG discussed in detail with patient and family , and planed for high risk PCI today with insertion of impella for support.     PROCEDURE: [facility] , impella insertion     Physician: Dr Posada  Assistant: Dr Taylor, Dr Boykin,     ANESTHESIA TYPE:  [  ]General Anesthesia  [  ] Sedation  [ x] Local/Regional    CONDITION  [  ] Critical  [  ] Serious  [  ]Fair  [x ]Good      IV CONTRAST:     150    mL      FINDINGS    Left Heart Catheterization:    LEFT HEART CATHERIZATION                                    Left main patent    LAD:   to prox LAD                         Left Circumflex: 75 % lesion       INTERVENTION  impella successfully inserted at the beginning of the case  C successful PCI to LCx wit DEJAN  impella successfully removed by end of the case.              PLAN OF CARE  [ x] Return to In-patient bed in CCU  [x ]  Continue DAPT, B-blocker & Statin therapy.      Electronic Signatures:  Luis Taylor)  (Signed 20-Dec-2018 09:40)  	Authored: Note Type, Note  Basilio Posada)  (Signed 20-Dec-2018 12:42)  	Co-Signer: Note Type      Last Updated: 20-Dec-2018 12:42 by Basilio Posada)        LABS                        10.8   8.99  )-----------( 229      ( 21 Dec 2018 04:16 )             33.4     12-21    143  |  109  |  31<H>  ----------------------------<  101<H>  4.6   |  18  |  1.3    Ca    8.9      21 Dec 2018 04:16              A/P:  I discussed the case with Interventional Cardiologist Dr. Posada & recommends the following:    S/P PCI LCX with Impella support  	         Continue DAPT(asa 81mg daily, plavix 75 mg daily,  B-Blocker, Statin Therapy, ace                    30 day supply of DAPT to be given by RN for home use only                   oob-ch, ambulate with assistance                    pt will need lifevest prior to D/C,                 d/c home  when lifevest in place as per Dr. Posada ([facility] aware of need for vest)                   Pt given instructions on importance of taking antiplatelet medication or risk acute stent thrombosis/death                   Post cath instructions, access site care and activity restrictions reviewed with patient                     Discussed with patient to return to hospital if experience chest pain, shortness breath, dizziness and site bleeding                   Aggressive risk factor modification, diet counseling, smoking cessation discussed with patient                        Follow up with Cardiology Dr. Posada in one week.  Instructed to call and make an appointment

## 2018-12-22 NOTE — PROGRESS NOTE ADULT - PROVIDER SPECIALTY LIST ADULT
CCU
CT Surgery
Cardiology
Critical Care
Hospitalist
CCU
Cardiology

## 2018-12-22 NOTE — PROGRESS NOTE ADULT - REASON FOR ADMISSION
Chest pain
Post Cath
Chest pain

## 2018-12-22 NOTE — PROGRESS NOTE ADULT - SUBJECTIVE AND OBJECTIVE BOX
Nurse alerted me that the pt and son had concerns prior to d/c home.  I spoke with the son and pt for approximately 25 mins and answered their question on the pt's condition and need to be compliant with her meds especially asa and plavix and that the stent could clot off and cause an MI/death if pt stopped taking over the next 6 months.  pt also aware that she is to wear the life vest to help prevent her from dying from an irregular heart beat that pt's like her can get.  lastely the pt and son are aware the pt needs close f/u with her PMD and her cardiologist DR. Posada.  pt and son state they understand and agrre to be compliant with the above treatments.
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
## 2020-01-01 ENCOUNTER — INPATIENT (INPATIENT)
Facility: HOSPITAL | Age: 80
LOS: 0 days | End: 2020-02-25
Attending: INTERNAL MEDICINE | Admitting: INTERNAL MEDICINE
Payer: MEDICARE

## 2020-01-01 VITALS — RESPIRATION RATE: 12 BRPM | HEART RATE: 53 BPM | OXYGEN SATURATION: 100 %

## 2020-01-01 DIAGNOSIS — I61.1 NONTRAUMATIC INTRACEREBRAL HEMORRHAGE IN HEMISPHERE, CORTICAL: ICD-10-CM

## 2020-01-01 DIAGNOSIS — Z79.82 LONG TERM (CURRENT) USE OF ASPIRIN: ICD-10-CM

## 2020-01-01 DIAGNOSIS — R68.0 HYPOTHERMIA, NOT ASSOCIATED WITH LOW ENVIRONMENTAL TEMPERATURE: ICD-10-CM

## 2020-01-01 DIAGNOSIS — Z66 DO NOT RESUSCITATE: ICD-10-CM

## 2020-01-01 DIAGNOSIS — G93.5 COMPRESSION OF BRAIN: ICD-10-CM

## 2020-01-01 DIAGNOSIS — I60.9 NONTRAUMATIC SUBARACHNOID HEMORRHAGE, UNSPECIFIED: ICD-10-CM

## 2020-01-01 DIAGNOSIS — Z79.02 LONG TERM (CURRENT) USE OF ANTITHROMBOTICS/ANTIPLATELETS: ICD-10-CM

## 2020-01-01 DIAGNOSIS — I10 ESSENTIAL (PRIMARY) HYPERTENSION: ICD-10-CM

## 2020-01-01 DIAGNOSIS — I62.01 NONTRAUMATIC ACUTE SUBDURAL HEMORRHAGE: ICD-10-CM

## 2020-01-01 DIAGNOSIS — E78.5 HYPERLIPIDEMIA, UNSPECIFIED: ICD-10-CM

## 2020-01-01 DIAGNOSIS — I46.9 CARDIAC ARREST, CAUSE UNSPECIFIED: ICD-10-CM

## 2020-01-01 DIAGNOSIS — I25.10 ATHEROSCLEROTIC HEART DISEASE OF NATIVE CORONARY ARTERY WITHOUT ANGINA PECTORIS: ICD-10-CM

## 2020-01-01 LAB
ALBUMIN SERPL ELPH-MCNC: 4.6 G/DL — SIGNIFICANT CHANGE UP (ref 3.5–5.2)
ALP SERPL-CCNC: 74 U/L — SIGNIFICANT CHANGE UP (ref 30–115)
ALT FLD-CCNC: 19 U/L — SIGNIFICANT CHANGE UP (ref 0–41)
ANION GAP SERPL CALC-SCNC: 15 MMOL/L — HIGH (ref 7–14)
APTT BLD: 28.9 SEC — SIGNIFICANT CHANGE UP (ref 27–39.2)
AST SERPL-CCNC: 28 U/L — SIGNIFICANT CHANGE UP (ref 0–41)
BASOPHILS # BLD AUTO: 0.06 K/UL — SIGNIFICANT CHANGE UP (ref 0–0.2)
BASOPHILS NFR BLD AUTO: 0.4 % — SIGNIFICANT CHANGE UP (ref 0–1)
BILIRUB SERPL-MCNC: 0.6 MG/DL — SIGNIFICANT CHANGE UP (ref 0.2–1.2)
BLD GP AB SCN SERPL QL: SIGNIFICANT CHANGE UP
BUN SERPL-MCNC: 20 MG/DL — SIGNIFICANT CHANGE UP (ref 10–20)
CALCIUM SERPL-MCNC: 9.5 MG/DL — SIGNIFICANT CHANGE UP (ref 8.5–10.1)
CHLORIDE SERPL-SCNC: 106 MMOL/L — SIGNIFICANT CHANGE UP (ref 98–110)
CO2 SERPL-SCNC: 20 MMOL/L — SIGNIFICANT CHANGE UP (ref 17–32)
CREAT SERPL-MCNC: 1.1 MG/DL — SIGNIFICANT CHANGE UP (ref 0.7–1.5)
EOSINOPHIL # BLD AUTO: 0.12 K/UL — SIGNIFICANT CHANGE UP (ref 0–0.7)
EOSINOPHIL NFR BLD AUTO: 0.8 % — SIGNIFICANT CHANGE UP (ref 0–8)
GLUCOSE SERPL-MCNC: 229 MG/DL — HIGH (ref 70–99)
HCT VFR BLD CALC: 37.1 % — SIGNIFICANT CHANGE UP (ref 37–47)
HGB BLD-MCNC: 12.7 G/DL — SIGNIFICANT CHANGE UP (ref 12–16)
IMM GRANULOCYTES NFR BLD AUTO: 0.7 % — HIGH (ref 0.1–0.3)
INR BLD: 0.98 RATIO — SIGNIFICANT CHANGE UP (ref 0.65–1.3)
LIDOCAIN IGE QN: 73 U/L — HIGH (ref 7–60)
LYMPHOCYTES # BLD AUTO: 1.97 K/UL — SIGNIFICANT CHANGE UP (ref 1.2–3.4)
LYMPHOCYTES # BLD AUTO: 13.7 % — LOW (ref 20.5–51.1)
MAGNESIUM SERPL-MCNC: 2.1 MG/DL — SIGNIFICANT CHANGE UP (ref 1.8–2.4)
MCHC RBC-ENTMCNC: 30.2 PG — SIGNIFICANT CHANGE UP (ref 27–31)
MCHC RBC-ENTMCNC: 34.2 G/DL — SIGNIFICANT CHANGE UP (ref 32–37)
MCV RBC AUTO: 88.1 FL — SIGNIFICANT CHANGE UP (ref 81–99)
MONOCYTES # BLD AUTO: 0.65 K/UL — HIGH (ref 0.1–0.6)
MONOCYTES NFR BLD AUTO: 4.5 % — SIGNIFICANT CHANGE UP (ref 1.7–9.3)
NEUTROPHILS # BLD AUTO: 11.44 K/UL — HIGH (ref 1.4–6.5)
NEUTROPHILS NFR BLD AUTO: 79.9 % — HIGH (ref 42.2–75.2)
NRBC # BLD: 0 /100 WBCS — SIGNIFICANT CHANGE UP (ref 0–0)
PHOSPHATE SERPL-MCNC: 2 MG/DL — LOW (ref 2.1–4.9)
PLATELET # BLD AUTO: 251 K/UL — SIGNIFICANT CHANGE UP (ref 130–400)
POTASSIUM SERPL-MCNC: 3.9 MMOL/L — SIGNIFICANT CHANGE UP (ref 3.5–5)
POTASSIUM SERPL-SCNC: 3.9 MMOL/L — SIGNIFICANT CHANGE UP (ref 3.5–5)
PROT SERPL-MCNC: 7.3 G/DL — SIGNIFICANT CHANGE UP (ref 6–8)
PROTHROM AB SERPL-ACNC: 11.3 SEC — SIGNIFICANT CHANGE UP (ref 9.95–12.87)
RBC # BLD: 4.21 M/UL — SIGNIFICANT CHANGE UP (ref 4.2–5.4)
RBC # FLD: 13.2 % — SIGNIFICANT CHANGE UP (ref 11.5–14.5)
SODIUM SERPL-SCNC: 141 MMOL/L — SIGNIFICANT CHANGE UP (ref 135–146)
TROPONIN T SERPL-MCNC: <0.01 NG/ML — SIGNIFICANT CHANGE UP
WBC # BLD: 14.34 K/UL — HIGH (ref 4.8–10.8)
WBC # FLD AUTO: 14.34 K/UL — HIGH (ref 4.8–10.8)

## 2020-01-01 PROCEDURE — 99283 EMERGENCY DEPT VISIT LOW MDM: CPT

## 2020-01-01 PROCEDURE — 70450 CT HEAD/BRAIN W/O DYE: CPT | Mod: 26

## 2020-01-01 PROCEDURE — 71045 X-RAY EXAM CHEST 1 VIEW: CPT | Mod: 26

## 2020-01-01 PROCEDURE — 36620 INSERTION CATHETER ARTERY: CPT

## 2020-01-01 PROCEDURE — 31500 INSERT EMERGENCY AIRWAY: CPT | Mod: GC

## 2020-01-01 PROCEDURE — 99291 CRITICAL CARE FIRST HOUR: CPT | Mod: 25,GC

## 2020-01-01 RX ORDER — ROCURONIUM BROMIDE 10 MG/ML
100 VIAL (ML) INTRAVENOUS ONCE
Refills: 0 | Status: COMPLETED | OUTPATIENT
Start: 2020-01-01 | End: 2020-01-01

## 2020-01-01 RX ORDER — ETOMIDATE 2 MG/ML
20 INJECTION INTRAVENOUS ONCE
Refills: 0 | Status: COMPLETED | OUTPATIENT
Start: 2020-01-01 | End: 2020-01-01

## 2020-01-01 RX ORDER — SODIUM CHLORIDE 9 MG/ML
500 INJECTION INTRAMUSCULAR; INTRAVENOUS; SUBCUTANEOUS ONCE
Refills: 0 | Status: COMPLETED | OUTPATIENT
Start: 2020-01-01 | End: 2020-01-01

## 2020-01-01 RX ORDER — SODIUM CHLORIDE 9 MG/ML
1000 INJECTION INTRAMUSCULAR; INTRAVENOUS; SUBCUTANEOUS
Refills: 0 | Status: DISCONTINUED | OUTPATIENT
Start: 2020-01-01 | End: 2020-01-01

## 2020-01-01 RX ORDER — LABETALOL HCL 100 MG
10 TABLET ORAL ONCE
Refills: 0 | Status: COMPLETED | OUTPATIENT
Start: 2020-01-01 | End: 2020-01-01

## 2020-01-01 RX ORDER — PROPOFOL 10 MG/ML
10 INJECTION, EMULSION INTRAVENOUS
Qty: 1000 | Refills: 0 | Status: DISCONTINUED | OUTPATIENT
Start: 2020-01-01 | End: 2020-01-01

## 2020-01-01 RX ADMIN — SODIUM CHLORIDE 500 MILLILITER(S): 9 INJECTION INTRAMUSCULAR; INTRAVENOUS; SUBCUTANEOUS at 13:05

## 2020-01-01 RX ADMIN — PROPOFOL 3.6 MICROGRAM(S)/KG/MIN: 10 INJECTION, EMULSION INTRAVENOUS at 13:15

## 2020-01-01 RX ADMIN — ETOMIDATE 20 MILLIGRAM(S): 2 INJECTION INTRAVENOUS at 13:05

## 2020-01-01 RX ADMIN — Medication 10 MILLIGRAM(S): at 15:35

## 2020-01-01 RX ADMIN — Medication 100 MILLIGRAM(S): at 13:05

## 2020-02-25 PROBLEM — I10 ESSENTIAL (PRIMARY) HYPERTENSION: Chronic | Status: ACTIVE | Noted: 2018-12-11

## 2020-02-25 NOTE — ED PROVIDER NOTE - CARE PLAN
Principal Discharge DX:	Intracranial hemorrhage Principal Discharge DX:	Intracranial hemorrhage  Secondary Diagnosis:	Herniation of brain stem

## 2020-02-25 NOTE — ED PROCEDURE NOTE - CPROC ED ARTER LINE DETAIL1
Using sterile technique, the correct location was identified, and a needle was inserted into the artery (specify in FT)./Hemostasis was achieved with direct pressure, and a dry sterile dressing applied./Positive blood return was obtained via the catheter./failed attempts x 2

## 2020-02-25 NOTE — ED PROVIDER NOTE - ATTENDING CONTRIBUTION TO CARE
80 yo f hx cad on asa/plavix, htn, hld here for ams. 20 minutes pta, pt felt nauseous, dizzy and became unresponsive. ems called and brought pt in. no hx trauma. pt was otherwise in usoh.    vs hypertensive, bradycardic  gen- elderly, not responding to painful stimulus  card-nabor, regular  lungs-ctab, no wheezing or rhonchi  abd-sntnd, no guarding or rebound  neuro- not following commands, no spont eye opening, L pupil 6mm, R pupil 3mm, no response to pain    c/f ICH  will admit for airway protection  cth, basic labs, ekg, trop, nsurg, icu

## 2020-02-25 NOTE — ED ADULT NURSE NOTE - NSIMPLEMENTINTERV_GEN_ALL_ED
Implemented All Fall with Harm Risk Interventions:  [place] to call system. Call bell, personal items and telephone within reach. Instruct patient to call for assistance. Room bathroom lighting operational. Non-slip footwear when patient is off stretcher. Physically safe environment: no spills, clutter or unnecessary equipment. Stretcher in lowest position, wheels locked, appropriate side rails in place. Provide visual cue, wrist band, yellow gown, etc. Monitor gait and stability. Monitor for mental status changes and reorient to person, place, and time. Review medications for side effects contributing to fall risk. Reinforce activity limits and safety measures with patient and family. Provide visual clues: red socks.

## 2020-02-25 NOTE — ED PROVIDER NOTE - OBJECTIVE STATEMENT
79y F presenting with AMS x20 minutes. Hx of 79y F presenting with AMS x20 minutes. Hx of HTN CAD on asa plavix, HL. 79y F presenting with AMS x20 minutes. Hx of HTN CAD on asa plavix p/w unresponsiveness. Patient had sudden onset severe headache, vomiting at home, unresponsive and hypertensive with EMS. Patient intubated in ED; blown pupil on left.

## 2020-02-25 NOTE — ED PROCEDURE NOTE - CPROC ED TRACHE INTUB DETAIL1
Patient was pre-oxygenated. An endotracheal tube (ETT) was placed through the vocal cords into the trachea.  ETT position was confirmed by auscultation of bilateral breath sounds to all lung fields. ETCO2 level was appropriate./Patient connected to ventilator with settings as ordered./Difficult/crash intubation (see additional details section)./During intubation, applied gentle pressure to the cricoid cartilage.

## 2020-02-25 NOTE — ED PROVIDER NOTE - CRITICAL CARE PROVIDED
interpretation of diagnostic studies/conducted a detailed discussion of DNR status/documentation/additional history taking/consult w/ pt's family directly relating to pts condition/direct patient care (not related to procedure)

## 2020-02-25 NOTE — DISCHARGE NOTE FOR THE EXPIRED PATIENT - HOSPITAL COURSE
79 year old Female with PMHx of HTN, CAD on aspirin and plavix, HLD presents to the ER due to unresponsiveness. Intubated. Per family, patient was doing well this morning, complained of headaches and vomited twice.  Her son came to visit her and she was talking fine and suddenly became unresponsive.  He called EMS and brought her to ER.  IN ER, patient found to have a very large intraparenchymal left cerebral hemisphere hemorrhage with mass effect, severe shift of more than 1.5 cm.  PResence of subfalcine herniation.  Vitals showed hypothermia to 91.5, HR 52, BP of 197/86, intubated. Family at bedside aware of poor prognosis.  Patient was intubated transferred to ICU, shortly after patient had cardiac arrest.

## 2020-02-25 NOTE — ED ADULT NURSE NOTE - OBJECTIVE STATEMENT
Pt BIBA for unresponsiveness. As per EMS, pt was walking around house earlier in the day and complaining of a headache. Upon EMS arrival, pt unresponsive. GCS= 3 and fixed pupils noted upon arrival.

## 2020-02-25 NOTE — CONSULT NOTE ADULT - SUBJECTIVE AND OBJECTIVE BOX
Patient is a 79y old  Female who presents with a chief complaint of     HPI:  79y F presenting with AMS x20 minutes. Hx of HTN CAD on asa plavix p/w unresponsiveness. Patient had sudden onset severe headache, vomiting at home, unresponsive and hypertensive with EMS. Patient intubated in ED; blown pupil on left.    pt seen and examined at bedside pt is intubated doesnt follow commands , no eye opening     PAST MEDICAL & SURGICAL HISTORY:  Hypertension  No significant past surgical history      SOCIAL HX:   Nonsmoker    FAMILY HISTORY:  No pertinent family history in first degree relatives  :  No known cardiovacular family hisotry     Review Of Systems:     CONSTITUTIONAL:   no fever   no chills.  no weight gain   no weight loss    EYES:   no discharge,   no pain  no redness,   no visual changes.    ENT:   Ears: no ear pain and no hearing problems.  Nose: no nasal congestion and no nasal drainage.  Mouth/Throat: no dysphagia,  no hoarseness and no throat pain.  Neck: no lumps, no pain, no stiffness and no swollen glands.     CARDIOVASCULAR:   no chest pain,   no swelling  no palpitaions  no syncope    RESPIRATORY:  no SOB,  no wheezing ,  no respiratory difficulty  no sputum production    GASTROINTESTINAL:   no abdominal pain,   no constipation,   no diarrhea,   no vomiting.    GENITOURINARY:  no dysuria,   no frequency,   no urgency  no hematuria.    MUSCULOSKELETAL:   no back pain,   no musculoskeletal pain,  no weakness.    SKIN:   no jaundice,   no lesions,   no pruritis,   no rashes.    NEURO:   see HPI    PSYCHIATRIC:   no known mental health issues  no anxiety  no depression    ALLERGIC/IMMUNOLOGIC:   No active allergic or immunologic issues      Allergies    No Known Allergies    Intolerances          PHYSICAL EXAM    ICU Vital Signs Last 24 Hrs  T(C): 33.1 (25 Feb 2020 13:30), Max: 33.1 (25 Feb 2020 13:30)  T(F): 91.5 (25 Feb 2020 13:30), Max: 91.5 (25 Feb 2020 13:30)  HR: 52 (25 Feb 2020 14:00) (52 - 76)  BP: 197/86 (25 Feb 2020 14:00) (181/77 - 203/100)  BP(mean): --  ABP: --  ABP(mean): --  RR: 14 (25 Feb 2020 14:00) (12 - 14)  SpO2: 100% (25 Feb 2020 14:00) (100% - 100%)      CONSTITUTIONAL:   Ill appearing.  NAD    ENT:   + ET   Mouth with normal mucosa.   No thrush    EYES:   pupils blown. nonreactive    CARDIAC:   Normal rate,   Regular rhythm.    Heart sounds S1, S2.   No edema      Vascular:   normal systolic impulse  no bruits    RESPIRATORY:   No wheezing   Normal chest expansion  No use of accessory muscles    GASTROINTESTINAL:  Abdomen soft   Non-tender,   No guarding,   + BS    MUSCULOSKELETAL:   Range of motion is not limited,  Nno clubbing, cyanosis    NEUROLOGICAL:   sedated.  no gag no pupil reflexes      SKIN:   Skin normal color for race,   Warm and dry  No evidence of rash.    PSYCHIATRIC:   Normal mood and affect.   No apparent risk to self or others.    HEME LYMPH:   No splenomegaly.  No cervical  lymphadenopathy.  No inguinal lymphadenopathy              LABS:                          12.7   14.34 )-----------( 251      ( 25 Feb 2020 14:25 )             37.1                                               02-25    141  |  106  |  20  ----------------------------<  229<H>  3.9   |  20  |  1.1    Ca    9.5      25 Feb 2020 14:25  Phos  2.0     02-25  Mg     2.1     02-25    TPro  7.3  /  Alb  4.6  /  TBili  0.6  /  DBili  x   /  AST  28  /  ALT  19  /  AlkPhos  74  02-25      PT/INR - ( 25 Feb 2020 14:25 )   PT: 11.30 sec;   INR: 0.98 ratio         PTT - ( 25 Feb 2020 14:25 )  PTT:28.9 sec                                           CARDIAC MARKERS ( 25 Feb 2020 14:25 )  x     / <0.01 ng/mL / x     / x     / x                                                LIVER FUNCTIONS - ( 25 Feb 2020 14:25 )  Alb: 4.6 g/dL / Pro: 7.3 g/dL / ALK PHOS: 74 U/L / ALT: 19 U/L / AST: 28 U/L / GGT: x                                                                                               Mode: AC/ CMV (Assist Control/ Continuous Mandatory Ventilation)  RR (machine): 14  TV (machine): 400  FiO2: 60  PEEP: 5  ITime: 1  MAP: 8  PIP: 16                                          MEDICATIONS  (STANDING):  labetalol Injectable 10 milliGRAM(s) IV Push once  propofol Infusion 10 MICROgram(s)/kG/Min (3.6 mL/Hr) IV Continuous <Continuous>  sodium chloride 0.9%. 1000 milliLiter(s) (125 mL/Hr) IV Continuous <Continuous>

## 2020-02-25 NOTE — H&P ADULT - HISTORY OF PRESENT ILLNESS
79 year old Female with PMHx of HTN, CAD on aspirin and plavix, HLD presents to the ER due to unresponsiveness. Intubated. Per family, patient was doing well this morning, complained of headaches and vomited twice.  Her son came to visit her and she was talking fine and suddenly became unresponsive.  He called EMS and brought her to ER.  IN ER, patient found to have a very large intrapernchymal left cerebral hemisphere hemorrhage with mass effect, severe shift of more than 1.5 cm.  PResence of subfalcine herniation.  Vitals showed hypothermia to 91.5, HR 52, BP of 197/86, intubated.   Family at bedside aware of poor prognosis.

## 2020-02-25 NOTE — CONSULT NOTE ADULT - ASSESSMENT
IMPRESSION:    Acute ICH with midline shift    PLAN:    CNS: Continue propofol. Neuro checks q1h. Follow up neuro and neurosurgery. Keppra    HEENT: oral care. et tube care    PULMONARY: HOB >45    CARDIOVASCULAR: keep i=o. Bp goal 140-160 systolic    GI: GI prophylaxis.  NPO for now    RENAL: fu lytes, replete as needed    INFECTIOUS DISEASE: no antibx    HEMATOLOGICAL:  SCDs.     ENDOCRINE:  Follow up FS.  Insulin protocol if needed.    Poor overall prognosis    Advanced directives IMPRESSION:  Acute hypoxemic respiratory failure   Acute ICH with midline shift    PLAN:    CNS: Continue propofol. Neuro checks q1h. Follow up neuro and neurosurgery. Keppra.     HEENT: oral care. et tube care    PULMONARY: HOB >45    CARDIOVASCULAR: keep i=o. Bp goal 140-160 systolic    GI: GI prophylaxis.  NPO for now    RENAL: fu lytes, replete as needed    INFECTIOUS DISEASE: no antibx    HEMATOLOGICAL:  SCDs.     ENDOCRINE:  Follow up FS.  Insulin protocol if needed.    Poor overall prognosis    Advanced directives

## 2020-02-25 NOTE — CONSULT NOTE ADULT - SUBJECTIVE AND OBJECTIVE BOX
HISTORY OF PRESENT ILLNESS:       79y F presenting with AMS x20 minutes. Hx of HTN CAD on asa plavix p/w unresponsiveness. Patient had sudden onset severe headache, vomiting at home, unresponsive and hypertensive with EMS. Patient intubated in ED; blown pupil on left.    pt seen and examined at bedside pt is intubated doesnt follow commands , no eye opening     PAST MEDICAL & SURGICAL HISTORY:  Hypertension  No significant past surgical history    FAMILY HISTORY:  No pertinent family history in first degree relatives      SOCIAL HISTORY:  Tobacco Use:  EtOH use:   Substance:    Allergies    No Known Allergies    Intolerances        REVIEW OF SYSTEMS  unresponsive     MEDICATIONS:  Antibiotics:    Neuro:  propofol Infusion 10 MICROgram(s)/kG/Min IV Continuous <Continuous>    Anticoagulation:    OTHER:    IVF:  sodium chloride 0.9%. 1000 milliLiter(s) IV Continuous <Continuous>      Vital Signs Last 24 Hrs  T(C): 33.1 (25 Feb 2020 13:30), Max: 33.1 (25 Feb 2020 13:30)  T(F): 91.5 (25 Feb 2020 13:30), Max: 91.5 (25 Feb 2020 13:30)  HR: 52 (25 Feb 2020 14:00) (52 - 76)  BP: 197/86 (25 Feb 2020 14:00) (181/77 - 203/100)  BP(mean): --  RR: 14 (25 Feb 2020 14:00) (12 - 14)  SpO2: 100% (25 Feb 2020 14:00) (100% - 100%)    PHYSICAL EXAM:    pupils Left 8 MM non reactive Right 6 mm non reactive   no corneal reflexes bilaterally   neg dolls eyes   no withdrawal with noxious stimuli to bilateral UE's   no withdrawal with noxious stimuli to bilateral LE's      LABS:                        12.7   14.34 )-----------( 251      ( 25 Feb 2020 14:25 )             37.1     02-25    141  |  106  |  20  ----------------------------<  229<H>  3.9   |  20  |  1.1    Ca    9.5      25 Feb 2020 14:25  Phos  2.0     02-25  Mg     2.1     02-25    TPro  7.3  /  Alb  4.6  /  TBili  0.6  /  DBili  x   /  AST  28  /  ALT  19  /  AlkPhos  74  02-25    PT/INR - ( 25 Feb 2020 14:25 )   PT: 11.30 sec;   INR: 0.98 ratio         PTT - ( 25 Feb 2020 14:25 )  PTT:28.9 sec          RADIOLOGY & ADDITIONAL STUDIES:  < from: CT Head No Cont (02.25.20 @ 13:43) >    1.  Very large intraparenchymal left cerebral hemisphere hemorrhage with associated mass effect, severe shift, and findings consistent with subfalcine herniation.    2.  Second smaller hemorrhage in the left superior parietal region.    3.  Left hemispheric acute subdural hemorrhage extending along the posterior interhemispheric fissure    4.  Left tentorial acute subdural hemorrhage.    5.  Left frontal and right parietal acute subarachnoid hemorrhage.    A/ p          79 year old female BIBA for AMS found to have large Left IPH           poor prognosis           prognosis discussed with the son

## 2020-02-25 NOTE — CONSULT NOTE ADULT - SUBJECTIVE AND OBJECTIVE BOX
Neurology Consult    Patient is a 79y old  Female who presents with a chief complaint of Unresponsiveness (25 Feb 2020 16:19)      HPI:  79 year old Female with PMHx of HTN, CAD on aspirin and plavix, HLD presents to the ER due to unresponsiveness. Intubated. Per family, patient was doing well this morning, complained of headaches and vomited twice.  Her son came to visit her and she was talking fine and suddenly became unresponsive.  He called EMS and brought her to ER.  IN ER, patient found to have a very large intrapernchymal left cerebral hemisphere hemorrhage with mass effect, severe shift of more than 1.5 cm.  PResence of subfalcine herniation.  Vitals showed hypothermia to 91.5, HR 52, BP of 197/86, intubated.   Family at bedside aware of poor prognosis. (25 Feb 2020 16:19)      PAST MEDICAL & SURGICAL HISTORY:  Coronary artery disease  Hypertension  No significant past surgical history      FAMILY HISTORY:      Social History: (-) x 3    Allergies    No Known Allergies    Intolerances        MEDICATIONS  (STANDING):  labetalol Injectable 10 milliGRAM(s) IV Push once  sodium chloride 0.9%. 1000 milliLiter(s) (125 mL/Hr) IV Continuous <Continuous>    MEDICATIONS  (PRN):      Review of systems:    Constitutional: No fever, weight loss or fatigue    Eyes: No eye pain or discharge  ENMT:  No difficulty hearing; No sinus or throat pain  Neck: No pain or stiffness  Respiratory: No cough, wheezing, chills or hemoptysis  Cardiovascular: No chest pain, palpitations, shortness of breath, dyspnea on exertion  Gastrointestinal: No abdominal pain, nausea, vomiting or hematemesis; No diarrhea or constipation.   Genitourinary: No dysuria, frequency, hematuria or incontinence  Neurological: As per HPI  Skin: No rashes or lesions   Endocrine: No heat or cold intolerance; No hair loss  Musculoskeletal: No joint pain or swelling  Psychiatric: No depression, anxiety, mood swings  Heme/Lymph: No easy bruising or bleeding gums    Vital Signs Last 24 Hrs  T(C): 33.1 (25 Feb 2020 13:30), Max: 33.1 (25 Feb 2020 13:30)  T(F): 91.5 (25 Feb 2020 13:30), Max: 91.5 (25 Feb 2020 13:30)  HR: 52 (25 Feb 2020 14:00) (52 - 76)  BP: 197/86 (25 Feb 2020 14:00) (181/77 - 203/100)  BP(mean): --  RR: 14 (25 Feb 2020 14:00) (12 - 14)  SpO2: 100% (25 Feb 2020 14:00) (100% - 100%)    Neurologic Examination:  Patient intubated, no paralytics or sedation.  Patient not overbreathing ventilator.  Pupils 7.5 mm right , 8.5 mm left and unreactive to light.,  Absent corneal response.  Absent oculocephalic response.  No cough to deep suctioning, no gag.    TM's visualized and cold water caloric exam is negative.    Patient does not response to loud voice or to deep painful stimuli.  No posturing or withdrawal to pain in extremities.    Labs:   CBC Full  -  ( 25 Feb 2020 14:25 )  WBC Count : 14.34 K/uL  RBC Count : 4.21 M/uL  Hemoglobin : 12.7 g/dL  Hematocrit : 37.1 %  Platelet Count - Automated : 251 K/uL  Mean Cell Volume : 88.1 fL  Mean Cell Hemoglobin : 30.2 pg  Mean Cell Hemoglobin Concentration : 34.2 g/dL  Auto Neutrophil # : 11.44 K/uL  Auto Lymphocyte # : 1.97 K/uL  Auto Monocyte # : 0.65 K/uL  Auto Eosinophil # : 0.12 K/uL  Auto Basophil # : 0.06 K/uL  Auto Neutrophil % : 79.9 %  Auto Lymphocyte % : 13.7 %  Auto Monocyte % : 4.5 %  Auto Eosinophil % : 0.8 %  Auto Basophil % : 0.4 %    02-25    141  |  106  |  20  ----------------------------<  229<H>  3.9   |  20  |  1.1    Ca    9.5      25 Feb 2020 14:25  Phos  2.0     02-25  Mg     2.1     02-25    TPro  7.3  /  Alb  4.6  /  TBili  0.6  /  DBili  x   /  AST  28  /  ALT  19  /  AlkPhos  74  02-25    LIVER FUNCTIONS - ( 25 Feb 2020 14:25 )  Alb: 4.6 g/dL / Pro: 7.3 g/dL / ALK PHOS: 74 U/L / ALT: 19 U/L / AST: 28 U/L / GGT: x           PT/INR - ( 25 Feb 2020 14:25 )   PT: 11.30 sec;   INR: 0.98 ratio         PTT - ( 25 Feb 2020 14:25 )  PTT:28.9 sec      Neuroimaging:  NCHCT: CT Head No Cont:   EXAM:  CT BRAIN            PROCEDURE DATE:  02/25/2020            INTERPRETATION:  Clinical History / Reason for exam: Change in mental status, patient on blood thinners.    CT SCAN OF THE BRAIN WITHOUT CONTRAST    TECHNIQUE:    Multiple transaxialnoncontrast CT images of the brain were obtained from base to vertex. Sagittal and coronal reformatted images were submitted.    FINDINGS:    There is a very large acute intraparenchymal hemorrhage involving the left temporal lobe, left occipital lobe left posterior frontal lobe and left parietal lobe and measures 9.2 x 7.4 x 4.7 cm with a mild amount of surrounding edema. The sulci in the left cerebral hemisphere are effaced. There is severe mass effect upon the adjacent left lateral ventricle and severe shift (greater than 1.5 cm) of the third and lateral ventricles to the right of midline. There is partial effacement of the left lateral ventricle and third ventricle. The right lateral ventricle and left temporal horn are dilated. There is effacement of the suprasellar cistern and basal cisterns, the fourth ventricle is small, there is herniation of the left frontal lobe beneath the anterior interhemispheric fissure, and dilatation of the right lateral ventricle. These findings are consistent with subfalcine herniation.    There is a smaller hemorrhage in the left superior parietal region measuring 2.2 x 1.4 x 2.2 cm.     There is a left frontal, temporal, parietal, occipital acute subdural hemorrhage 2.2 cm extending along the posterior interhemispheric fissure. It has a maximum width of 0.9 cm.    Large acute left tentorial subdural hemorrhage.    Acute subarachnoid hemorrhage within the left frontal lobe and right parietal lobe.    Vascular calcifications are noted.    IMPRESSION:    1.  Very large intraparenchymal left cerebral hemisphere hemorrhage with associated mass effect, severe shift, and findings consistent with subfalcine herniation.    2.  Second smaller hemorrhage in the left superior parietal region.    3.  Left hemispheric acute subdural hemorrhage extending along the posterior interhemispheric fissure    4.  Left tentorial acute subdural hemorrhage.    5.  Left frontal and right parietal acute subarachnoid hemorrhage.    Spoke with SOFÍA DO on 2/25/2020 1:45 PM with readback.      HOWIE GOMEZ M.D., ATTENDING RADIOLOGIST  This document has been electronically signed. Feb 25 2020  2:25PM             (02-25-20 @ 13:43)        Assessment:  This is a 79y Female on plavix and aspirin with new onset headache today and catastrophic left ICH with midline shift and subfalcine herniation in addition to left tentorial acute subdural hemorrhage and left frontal an dright parietal acute subarachnoid hemorrhage.  Current exam demonstrates brainstem areflexia.    Plan:   1.  Apnea test and if negative patient will meet criteria for brain death.  2.  I discussed patients current exam and CT findings with family members at bedside.      02-25-20 @ 16:52

## 2020-02-25 NOTE — ED PROVIDER NOTE - PROGRESS NOTE DETAILS
CO- case d/w neurosurg- no medical interventions. no recommendation for plt reversal, keppra, plt, mannitol or surgical procedure. d/w icu fellow, approved for ICU

## 2020-02-25 NOTE — H&P ADULT - ASSESSMENT
79 year old Female with PMHx of HTN, CAD on aspirin and plavix, HLD presents to the ER due to unresponsiveness.  IN ER, patient found to have a very large intrapernchymal left cerebral hemisphere hemorrhage with mass effect, severe shift of more than 1.5 cm.  PResence of subfalcine herniation.       # Large intraparenchymal hemorrhage with mass effect  - no brain stem reflexes  - current neurologic status not compatible with life  - Neurosurgery recommends palliative care 79 year old Female with PMHx of HTN, CAD on aspirin and plavix, HLD presents to the ER due to unresponsiveness.  IN ER, patient found to have a very large intrapernchymal left cerebral hemisphere hemorrhage with mass effect, severe shift of more than 1.5 cm.  PResence of subfalcine herniation.       # Large intraparenchymal hemorrhage with mass effect  - no brain stem reflexes  - current neurologic status not compatible with life  - Neurosurgery recommends palliative care  - Discussed the very poor prognosis with Son and family  - Pt DNR. At this time, family want patient to be left on the ventilator and allow her to pass naturally. Patient imminently dying. Family aware.

## 2020-02-25 NOTE — ED PROVIDER NOTE - PHYSICAL EXAMINATION
CONSTITUTIONAL: Well-developed; well-nourished; unresponsive.  SKIN: warm, dry.  HEAD: Normocephalic; atraumatic.  EYES: PERRL, EOMI, no conjunctival erythema.  ENT: No nasal discharge; airway clear.  NECK: Supple; non tender.  CARD: S1, S2 normal; no murmurs, gallops, or rubs. bradycardic.   RESP: bibasilar crackles.   ABD: soft nondistended.   EXT: no peripheral edema.   NEURO: unresponsive to painful stimuli; left pupil 8mm- fixed.

## 2020-02-25 NOTE — H&P ADULT - NSHPPHYSICALEXAM_GEN_ALL_CORE
ICU Vital Signs Last 24 Hrs  T(C): 33.1 (25 Feb 2020 13:30), Max: 33.1 (25 Feb 2020 13:30)  T(F): 91.5 (25 Feb 2020 13:30), Max: 91.5 (25 Feb 2020 13:30)  HR: 52 (25 Feb 2020 14:00) (52 - 76)  BP: 197/86 (25 Feb 2020 14:00) (181/77 - 203/100)  BP(mean): --  ABP: --  ABP(mean): --  RR: 14 (25 Feb 2020 14:00) (12 - 14)  SpO2: 100% (25 Feb 2020 14:00) (100% - 100%)      PHYSICAL EXAM:  GENERAL: intubated. No sedation  HEAD:  Atraumatic, Normocephalic  EYES: fixed dilated pupils  NECK: Supple, no JVD, cervical lymphadenopathy or thyromegaly  CHEST/LUNG: Clear to auscultation bilaterally; No wheeze, rhonchi, or rales  HEART: bradycardic  ABDOMEN: Soft, nontender,  EXTREMITIES:  cold extremities  NEUROLOGY: Fixed dilated pupils, no gag reflex, no brain stem reflex

## 2020-02-25 NOTE — H&P ADULT - NSHPLABSRESULTS_GEN_ALL_CORE
12.7   14.34 )-----------( 251      ( 25 Feb 2020 14:25 )             37.1     02-25 @ 14:25  Auto Basophil #0.06 K/uL  Auto Basophil %0.4 %  Auto Eosinophil #0.12 K/uL  Auto Eosiniophil %0.8 %  Auto Immature Granulocyte %0.7 %<H>  Auto Lymphocyte #1.97 K/uL  Auto Lymphocyte %13.7 %<L>  Auto Monocyte #0.65 K/uL<H>  Auto Monocyte %4.5 %  Auto Neutrophil #11.44 K/uL<H>  Auto Neutrophil %79.9 %<H>      141  |  106  |  20  ----------------------------<  229<H>  02-25 @ 14:25  3.9   |  20  |  1.1        Ca    9.5   Phos  2.0   Mg     2.1     TPro  7.3  /  Alb  4.6  /  TBili  0.6  /  DBili  x   /  AST  28  /  ALT  19  /  AlkPhos  74      < from: CT Head No Cont (02.25.20 @ 13:43) >    IMPRESSION:    1.  Very large intraparenchymal left cerebral hemisphere hemorrhage with associated mass effect, severe shift, and findings consistent with subfalcine herniation.    2.  Second smaller hemorrhage in the left superior parietal region.    3.  Left hemispheric acute subdural hemorrhage extending along the posterior interhemispheric fissure    4.  Left tentorial acute subdural hemorrhage.    5.  Left frontal and right parietal acute subarachnoid hemorrhage.    < end of copied text >

## 2024-03-21 NOTE — PATIENT PROFILE ADULT - PRO INTERPRETER NEED 2
"  Assessment & Plan     Age related osteoporosis, unspecified pathological fracture presence  Patient willing to re-try a bisphosphonate.  Didn't \"give it a chance\" the last time as she was worried about side effects    Risks, benefits and alternatives discussed       - IBANdronate (BONIVA) 150 MG tablet; Take 1 tablet (150 mg) by mouth every 30 days            BMI  Estimated body mass index is 27.62 kg/m  as calculated from the following:    Height as of this encounter: 1.575 m (5' 2\").    Weight as of this encounter: 68.5 kg (151 lb).             Subjective   Idalmis is a 79 year old, presenting for the following health issues:  Osteoporosis        3/21/2024     3:02 PM   Additional Questions   Roomed by Dana ferrer CMA   Accompanied by Self     History of Present Illness       Reason for visit:  Bone density test    She eats 2-3 servings of fruits and vegetables daily.She consumes 1 sweetened beverage(s) daily.She exercises with enough effort to increase her heart rate 10 to 19 minutes per day.  She exercises with enough effort to increase her heart rate 3 or less days per week.   She is taking medications regularly.       - Follow up after having DEXA completed 2/29/2024. She is taking calcium with vitamin D 600md every day and getting 2-3 days per week of exercise. Discuss starting medication for this.      Review of Systems  Constitutional, HEENT, cardiovascular, pulmonary, gi and gu systems are negative, except as otherwise noted.      Objective    /72   Pulse 79   Temp 97  F (36.1  C) (Tympanic)   Ht 1.575 m (5' 2\")   Wt 68.5 kg (151 lb)   LMP 01/01/1992   SpO2 95%   BMI 27.62 kg/m    Body mass index is 27.62 kg/m .  Physical Exam   GENERAL: alert and no distress  ORTHO: significant scoliosis and deformity of spine  Left hip moved with good range of motion and no pain  Strength normal  +SLR on the left            Signed Electronically by: Ale Ordaz MD    " [place]